# Patient Record
Sex: FEMALE | NOT HISPANIC OR LATINO | Employment: OTHER | URBAN - METROPOLITAN AREA
[De-identification: names, ages, dates, MRNs, and addresses within clinical notes are randomized per-mention and may not be internally consistent; named-entity substitution may affect disease eponyms.]

---

## 2023-08-24 ENCOUNTER — HOSPITAL ENCOUNTER (EMERGENCY)
Facility: CLINIC | Age: 68
Discharge: HOME | End: 2023-08-24
Attending: EMERGENCY MEDICINE | Admitting: EMERGENCY MEDICINE
Payer: COMMERCIAL

## 2023-08-24 ENCOUNTER — APPOINTMENT (OUTPATIENT)
Dept: CT IMAGING | Facility: CLINIC | Age: 68
End: 2023-08-24
Payer: COMMERCIAL

## 2023-08-24 VITALS
OXYGEN SATURATION: 99 % | SYSTOLIC BLOOD PRESSURE: 174 MMHG | TEMPERATURE: 98 F | BODY MASS INDEX: 31.98 KG/M2 | RESPIRATION RATE: 16 BRPM | HEIGHT: 68 IN | HEART RATE: 71 BPM | DIASTOLIC BLOOD PRESSURE: 84 MMHG | WEIGHT: 211 LBS

## 2023-08-24 DIAGNOSIS — K63.89 EPIPLOIC APPENDAGITIS: ICD-10-CM

## 2023-08-24 LAB
ALBUMIN SERPL-MCNC: 4 G/DL (ref 3.5–5)
ALBUMIN UR-MCNC: NEGATIVE MG/DL
ALP SERPL-CCNC: 76 U/L (ref 45–120)
ALT SERPL W P-5'-P-CCNC: 17 U/L (ref 0–45)
ANION GAP SERPL CALCULATED.3IONS-SCNC: 13 MMOL/L (ref 5–18)
APPEARANCE UR: CLEAR
AST SERPL W P-5'-P-CCNC: 14 U/L (ref 0–40)
BASOPHILS # BLD AUTO: 0.1 10E3/UL (ref 0–0.2)
BASOPHILS NFR BLD AUTO: 1 %
BILIRUB DIRECT SERPL-MCNC: 0.2 MG/DL
BILIRUB SERPL-MCNC: 0.8 MG/DL (ref 0–1)
BILIRUB UR QL STRIP: NEGATIVE
BUN SERPL-MCNC: 14 MG/DL (ref 8–22)
CALCIUM SERPL-MCNC: 9.8 MG/DL (ref 8.5–10.5)
CHLORIDE BLD-SCNC: 107 MMOL/L (ref 98–107)
CO2 SERPL-SCNC: 21 MMOL/L (ref 22–31)
COLOR UR AUTO: COLORLESS
CREAT SERPL-MCNC: 0.79 MG/DL (ref 0.6–1.1)
EOSINOPHIL # BLD AUTO: 0.1 10E3/UL (ref 0–0.7)
EOSINOPHIL NFR BLD AUTO: 1 %
ERYTHROCYTE [DISTWIDTH] IN BLOOD BY AUTOMATED COUNT: 13.2 % (ref 10–15)
GFR SERPL CREATININE-BSD FRML MDRD: 83 ML/MIN/1.73M2
GLUCOSE BLD-MCNC: 112 MG/DL (ref 70–125)
GLUCOSE UR STRIP-MCNC: NEGATIVE MG/DL
HCT VFR BLD AUTO: 41.3 % (ref 35–47)
HGB BLD-MCNC: 13.5 G/DL (ref 11.7–15.7)
HGB UR QL STRIP: NEGATIVE
IMM GRANULOCYTES # BLD: 0 10E3/UL
IMM GRANULOCYTES NFR BLD: 0 %
KETONES UR STRIP-MCNC: NEGATIVE MG/DL
LEUKOCYTE ESTERASE UR QL STRIP: NEGATIVE
LIPASE SERPL-CCNC: 34 U/L (ref 0–52)
LYMPHOCYTES # BLD AUTO: 2.1 10E3/UL (ref 0.8–5.3)
LYMPHOCYTES NFR BLD AUTO: 27 %
MCH RBC QN AUTO: 28 PG (ref 26.5–33)
MCHC RBC AUTO-ENTMCNC: 32.7 G/DL (ref 31.5–36.5)
MCV RBC AUTO: 86 FL (ref 78–100)
MONOCYTES # BLD AUTO: 0.6 10E3/UL (ref 0–1.3)
MONOCYTES NFR BLD AUTO: 7 %
NEUTROPHILS # BLD AUTO: 5.1 10E3/UL (ref 1.6–8.3)
NEUTROPHILS NFR BLD AUTO: 64 %
NITRATE UR QL: NEGATIVE
NRBC # BLD AUTO: 0 10E3/UL
NRBC BLD AUTO-RTO: 0 /100
PH UR STRIP: 6.5 [PH] (ref 5–7)
PLATELET # BLD AUTO: 257 10E3/UL (ref 150–450)
POTASSIUM BLD-SCNC: 4.2 MMOL/L (ref 3.5–5)
PROT SERPL-MCNC: 7.4 G/DL (ref 6–8)
RBC # BLD AUTO: 4.82 10E6/UL (ref 3.8–5.2)
RBC URINE: 1 /HPF
SODIUM SERPL-SCNC: 141 MMOL/L (ref 136–145)
SP GR UR STRIP: <1.005 (ref 1–1.03)
SQUAMOUS EPITHELIAL: 1 /HPF
UROBILINOGEN UR STRIP-MCNC: <2 MG/DL
WBC # BLD AUTO: 7.9 10E3/UL (ref 4–11)
WBC URINE: 1 /HPF

## 2023-08-24 PROCEDURE — 82248 BILIRUBIN DIRECT: CPT

## 2023-08-24 PROCEDURE — 81001 URINALYSIS AUTO W/SCOPE: CPT

## 2023-08-24 PROCEDURE — 83690 ASSAY OF LIPASE: CPT

## 2023-08-24 PROCEDURE — 74177 CT ABD & PELVIS W/CONTRAST: CPT

## 2023-08-24 PROCEDURE — 99285 EMERGENCY DEPT VISIT HI MDM: CPT | Mod: 25

## 2023-08-24 PROCEDURE — 250N000013 HC RX MED GY IP 250 OP 250 PS 637

## 2023-08-24 PROCEDURE — 80053 COMPREHEN METABOLIC PANEL: CPT

## 2023-08-24 PROCEDURE — 250N000011 HC RX IP 250 OP 636: Performed by: EMERGENCY MEDICINE

## 2023-08-24 PROCEDURE — 85025 COMPLETE CBC W/AUTO DIFF WBC: CPT

## 2023-08-24 PROCEDURE — 36415 COLL VENOUS BLD VENIPUNCTURE: CPT

## 2023-08-24 PROCEDURE — 82310 ASSAY OF CALCIUM: CPT

## 2023-08-24 RX ORDER — ONDANSETRON 4 MG/1
4 TABLET, ORALLY DISINTEGRATING ORAL EVERY 8 HOURS PRN
Qty: 10 TABLET | Refills: 0 | Status: SHIPPED | OUTPATIENT
Start: 2023-08-24 | End: 2023-08-27

## 2023-08-24 RX ORDER — ACETAMINOPHEN 325 MG/1
650 TABLET ORAL ONCE
Status: COMPLETED | OUTPATIENT
Start: 2023-08-24 | End: 2023-08-24

## 2023-08-24 RX ORDER — IOPAMIDOL 755 MG/ML
100 INJECTION, SOLUTION INTRAVASCULAR ONCE
Status: COMPLETED | OUTPATIENT
Start: 2023-08-24 | End: 2023-08-24

## 2023-08-24 RX ADMIN — IOPAMIDOL 100 ML: 755 INJECTION, SOLUTION INTRAVENOUS at 11:11

## 2023-08-24 RX ADMIN — ACETAMINOPHEN 650 MG: 325 TABLET ORAL at 10:36

## 2023-08-24 ASSESSMENT — ENCOUNTER SYMPTOMS
DYSURIA: 0
VOMITING: 0
NAUSEA: 0
BACK PAIN: 0
LIGHT-HEADEDNESS: 0
CONSTIPATION: 1
ABDOMINAL PAIN: 1
DIZZINESS: 0
FREQUENCY: 0

## 2023-08-24 ASSESSMENT — ACTIVITIES OF DAILY LIVING (ADL)
ADLS_ACUITY_SCORE: 35
ADLS_ACUITY_SCORE: 35

## 2023-08-24 NOTE — ED TRIAGE NOTES
Pt woke with RLQ abdominal pain this morning.  Denies nausea, vomiting or diarrhea.  Afebrile.     Triage Assessment       Row Name 08/24/23 0954       Triage Assessment (Adult)    Airway WDL WDL       Respiratory WDL    Respiratory WDL WDL       Skin Circulation/Temperature WDL    Skin Circulation/Temperature WDL WDL       Cardiac WDL    Cardiac WDL WDL       Peripheral/Neurovascular WDL    Peripheral Neurovascular WDL WDL       Cognitive/Neuro/Behavioral WDL    Cognitive/Neuro/Behavioral WDL WDL

## 2023-08-24 NOTE — DISCHARGE INSTRUCTIONS
Rest.  Drink lots of fluids.  Take Tylenol as needed for your pain.  Take Zofran as prescribed.  Follow-up with your primary care doctor to discuss your ED visit.  Return to the ED if new symptoms develop (fevers, chills) or if symptoms worsen.

## 2023-08-24 NOTE — ED PROVIDER NOTES
"Emergency Department Staff Physician Note     I had a face to face encounter with this patient seen by the Advanced Practice Provider (SHAWNA).  I have seen, examined, and discussed the patient with the SHAWNA and agree with their assessment and plan of management.    Relevant HPI:     Carri Hauser is a 67 year old female who presents to the Emergency Department for evaluation of abdominal pain    Patient Woke up this morning with abdominal pain, though she was able to fall back asleep. She woke again and has consistently been experiencing the pain in her right lower quadrant that is exasperated by bumps in the road when driving. She is also constipated. She denies nausea, vomiting, urinary symptoms, frequency, chest pain, dizziness, lightheadedness, or back pain, and she is otherwise at a normal baseline of health.    I, Joshua Haro, am serving as a scribe to document services personally performed by Wil Bentley D.O., based on my observations and the provider's statements to me.   I, Wil Bentley D.O., attest that Joshua Haro was acting in a scribe capacity, has observed my performance of the services and has documented them in accordance with my direction.    ED Course:  10:11 AM  I received the patient report from the SHAWNA, Genesis Person. I agree with their assessment and plan of management, and I will see the patient.  10:14 AM I met with the patient to introduce myself, gather additional history, perform my initial exam, and discuss the plan.     Brief Physical Exam:  VITAL SIGNS: BP (!) 174/84   Pulse 71   Temp 98  F (36.7  C) (Temporal)   Resp 16   Ht 1.727 m (5' 8\")   Wt 95.7 kg (211 lb)   SpO2 99%   BMI 32.08 kg/m      General Appearance: Well-appearing, well-nourished, no acute distress   Head:  Normocephalic, without obvious abnormality, atraumatic  Eyes:  PERRL, conjunctiva/corneas clear, EOM's intact,  ENT:  Lips, mucosa, and tongue normal, membranes are moist without pallor  Neck:  Normal " ROM, symmetrical, trachea midline    Cardio:  Regular rate and rhythm, no murmur, rub or gallop, 2+ pulses symmetric in all extremities  Pulm:  Clear to auscultation bilaterally, respirations unlabored,   Abdomen:  Soft, no guarding. Positive Rovsing's sign. Right lower quadrant tenderness with rebound.  Musculoskeletal: Full ROM, no edema, no cyanosis, good ROM of major joints  Integument:  Warm, Dry, No erythema, No rash.    Neurologic:  Alert & oriented.  No focal deficits appreciated.  Ambulatory.  Psychiatric:  Affect normal, Judgment normal, Mood normal.         Impression / ED Plan:  I had a face to face encounter with this patient seen by the Advanced Practice Provider (SHAWNA).  I have seen, examined, and discussed the patient with the SHAWNA and agree with their assessment and plan of management. I personally saw the patient and performed a substantive portion of the visit including all aspects of the medical decision making.    Carri Hauser is a 67 year old female presents to the ED for evaluation of right-sided abdominal pain.  Patient's pain is primarily mid to right lower abdomen.  Did have some rebound tenderness.  Initial differential included appendicitis, cholelithiasis, cholecystitis, diverticulitis, bowel obstruction, volvulus, mesenteric ischemia.  CT imaging was performed, no evidence of appendicitis, however was consistent with epiploic appendagitis which would be consistent with the patient's pain.  At this time I do believe the patient is appropriate for discharge with outpatient follow-up with primary care provider.  Return precautions discussed..     1. Epiploic appendagitis        Wil Bentley D.O.  Staff Physician  [facility] Emergency Department      Wil Bentley,   08/24/23 5606

## 2023-08-24 NOTE — ED PROVIDER NOTES
EMERGENCY DEPARTMENT ENCOUNTER      NAME: Carri Hauser  AGE: 67 year old female  YOB: 1955  MRN: [id number]  EVALUATION DATE & TIME: 8/24/2023 10:00 AM    PCP: No primary care provider on file.    ED PROVIDER: Genesis Person PA-C      Chief Complaint   Patient presents with    Abdominal Pain     FINAL IMPRESSION:  1. Epiploic appendagitis      ED COURSE & MEDICAL DECISION MAKING:    Pertinent Labs & Imaging studies reviewed. (See chart for details)  67 year old female presents to the Emergency Department for evaluation of lower quadrant abdominal pain.  This morning patient woke up with right lower quadrant abdominal pain that has been constant since it started.  Nonradiating.  Vital signs reviewed and patient is hypertensive most likely secondary due to pain.  Afebrile.  On exam patient has right lower quadrant abdominal pain.  No CVA tenderness bilaterally.  No rash.    Differential diagnosis includes appendicitis, kidney stone, UTI, small bowel obstruction, diverticulitis.  Patient has a history of cholecystectomy. CBC unremarkable.  No white blood cell elevation.  Basic is unremarkable.  Lipase and hepatic unremarkable.  UA is unremarkable.  CT scan shows a small segment into the colon at the hepatic flexure is mildly thick-walled with adjacent ovoid omental fat inflammation most suggestive of epiploic appendagitis with reactive colitis.  Also low attending UA showed 9 mm peritoneal nodule in the upper abdomen is of unclear clinical significance.  Was educated on her imaging and lab results. Received a dose of Tylenol and Dilaudid while in the ED.  Patient resting comfortably.    Patient will be discharged home.  Patient will follow-up with her primary care doctor to discuss her ED visit.  Patient return to the ED if new symptoms develop or symptoms worsen.  Questions answered.  Patient agrees with plan.      ED COURSE:   10:03 AM I saw the patient.   10:11 AM Staffed patient with Dr. De La Torre  Mc and discussed plan of care.  12:44 PM discharged home.  Patient with plan.  All questions answered.       At the conclusion of the encounter I discussed the results of all of the tests and the disposition. The questions were answered. The patient or family acknowledged understanding and was agreeable with the care plan.     0 minutes of critical care time       Additional Documentation    History:  Supplemental history from: Documented in chart, if applicable  External Record(s) reviewed: Documented in chart, if applicable.    Work Up:  Chart documentation includes differential considered and any EKGs or imaging interpreted by provider.  In additional to work up documented, I considered the following work up: Documented in chart, if applicable.    External consultation:  Discussion of management with another provider: Documented in chart, if applicable    Complicating factors:  Care impacted by chronic illness: Heart Disease, Hyperlipidemia, and Hypertension  Care affected by social determinants of health: N/A    Disposition considerations: Discharge. No recommendations on prescription strength medication(s). See documentation for any additional details.      MEDICATIONS GIVEN IN THE EMERGENCY:  Medications   acetaminophen (TYLENOL) tablet 650 mg (650 mg Oral $Given 8/24/23 1036)   iopamidol (ISOVUE-370) solution 100 mL (100 mLs Intravenous $Given 8/24/23 1111)   HYDROmorphone (DILAUDID) injection 1 mg (1 mg Intravenous Not Given 8/24/23 1302)       NEW PRESCRIPTIONS STARTED AT TODAY'S ER VISIT  Discharge Medication List as of 8/24/2023 12:44 PM        START taking these medications    Details   ondansetron (ZOFRAN ODT) 4 MG ODT tab Take 1 tablet (4 mg) by mouth every 8 hours as needed for nausea, Disp-10 tablet, R-0, Local Print                =================================================================    HPI    Patient information was obtained from: patient     Use of : N/ADITYA AGUILAR  "Murtaza is a 67 year old female with a pertinent history of hypertension, hyperlipidemia, atrial fibrillation, arteriosclerotic cardiovascular disease, hypothyroidism, who presents to this ED via walk-in for evaluation of abdominal pain.    Patient woke up this morning with abdominal pain, though she was able to fall back asleep. She woke again and has consistently been experiencing the pain in her right lower quadrant that is exasperated by bumps in the road when driving. She is also constipated. She denies nausea, vomiting, urinary symptoms, frequency, chest pain, dizziness, lightheadedness, or back pain, and she is otherwise at a normal baseline of health.    She has a surgical history of cholecystectomy. She drinks casually and does not smoke.    REVIEW OF SYSTEMS   Review of Systems   Cardiovascular:  Negative for chest pain.   Gastrointestinal:  Positive for abdominal pain (right lower quadrant) and constipation. Negative for nausea and vomiting.   Genitourinary:  Negative for dysuria and frequency.   Musculoskeletal:  Negative for back pain.   Neurological:  Negative for dizziness and light-headedness.        PAST MEDICAL HISTORY:  No past medical history on file.    PAST SURGICAL HISTORY:  No past surgical history on file.        CURRENT MEDICATIONS:    ondansetron (ZOFRAN ODT) 4 MG ODT tab        ALLERGIES:  Allergies   Allergen Reactions    Pseudoephedrine Other (See Comments)     Other Reaction(s): Tachycardia    Tetracycline Hives    Adhesive Tape Rash     Echocardiogram stickers       FAMILY HISTORY:  No family history on file.    SOCIAL HISTORY:   Social History     Socioeconomic History    Marital status:        VITALS:  BP (!) 174/84   Pulse 71   Temp 98  F (36.7  C) (Temporal)   Resp 16   Ht 1.727 m (5' 8\")   Wt 95.7 kg (211 lb)   SpO2 99%   BMI 32.08 kg/m      PHYSICAL EXAM    Physical Exam  Vitals and nursing note reviewed.   Constitutional:       Appearance: Normal appearance.   HENT: "      Head: Atraumatic.      Right Ear: External ear normal.      Left Ear: External ear normal.      Nose: Nose normal.      Mouth/Throat:      Mouth: Mucous membranes are moist.   Eyes:      Conjunctiva/sclera: Conjunctivae normal.      Pupils: Pupils are equal, round, and reactive to light.   Cardiovascular:      Rate and Rhythm: Normal rate and regular rhythm.      Pulses: Normal pulses.      Heart sounds: Normal heart sounds. No murmur heard.     No friction rub. No gallop.   Pulmonary:      Effort: Pulmonary effort is normal.      Breath sounds: Normal breath sounds. No wheezing or rales.   Abdominal:      General: Bowel sounds are normal.      Tenderness: There is abdominal tenderness in the right lower quadrant. There is no right CVA tenderness, left CVA tenderness, guarding or rebound. Positive signs include Rovsing's sign.   Musculoskeletal:      Cervical back: Normal range of motion.   Skin:     General: Skin is dry.      Capillary Refill: Capillary refill takes less than 2 seconds.      Findings: No rash.   Neurological:      Mental Status: She is alert. Mental status is at baseline.   Psychiatric:         Mood and Affect: Mood normal.         Thought Content: Thought content normal.          LAB:  All pertinent labs reviewed and interpreted.  Labs Ordered and Resulted from Time of ED Arrival to Time of ED Departure   BASIC METABOLIC PANEL - Abnormal       Result Value    Sodium 141      Potassium 4.2      Chloride 107      Carbon Dioxide (CO2) 21 (*)     Anion Gap 13      Urea Nitrogen 14      Creatinine 0.79      Calcium 9.8      Glucose 112      GFR Estimate 83     ROUTINE UA WITH MICROSCOPIC REFLEX TO CULTURE - Normal    Color Urine Colorless      Appearance Urine Clear      Glucose Urine Negative      Bilirubin Urine Negative      Ketones Urine Negative      Specific Gravity Urine <1.005      Blood Urine Negative      pH Urine 6.5      Protein Albumin Urine Negative      Urobilinogen Urine <2.0       Nitrite Urine Negative      Leukocyte Esterase Urine Negative      RBC Urine 1      WBC Urine 1      Squamous Epithelials Urine 1     LIPASE - Normal    Lipase 34     HEPATIC FUNCTION PANEL - Normal    Bilirubin Total 0.8      Bilirubin Direct 0.2      Protein Total 7.4      Albumin 4.0      Alkaline Phosphatase 76      AST 14      ALT 17     CBC WITH PLATELETS AND DIFFERENTIAL    WBC Count 7.9      RBC Count 4.82      Hemoglobin 13.5      Hematocrit 41.3      MCV 86      MCH 28.0      MCHC 32.7      RDW 13.2      Platelet Count 257      % Neutrophils 64      % Lymphocytes 27      % Monocytes 7      % Eosinophils 1      % Basophils 1      % Immature Granulocytes 0      NRBCs per 100 WBC 0      Absolute Neutrophils 5.1      Absolute Lymphocytes 2.1      Absolute Monocytes 0.6      Absolute Eosinophils 0.1      Absolute Basophils 0.1      Absolute Immature Granulocytes 0.0      Absolute NRBCs 0.0          RADIOLOGY:  Reviewed all pertinent imaging. Please see official radiology report.  CT Abdomen Pelvis w Contrast   Final Result   IMPRESSION:    1.  A small segment of the colon at the hepatic flexure is mildly thick-walled with adjacent ovoid omental fat inflammation, most suggestive of epiploic appendagitis with a reactive colitis. Consider screening colonoscopy after acute phase if clinically    appropriate.   2.  A low attenuating 9 mm peritoneal nodule in the upper abdomen is of unclear clinical significance and can be reassessed with CT abdomen pelvis in 3-6 months.        I, Joshua Haro, am serving as a scribe to document services personally performed by Genesis Person PA-C, based on my observation and the provider's statements to me. I, Genesis Person PA-C, attest that Joshua Haro is acting in a scribe capacity, has observed my performance of the services and has documented them in accordance with my direction.    Genesis Person PA-C  [facility] EMERGENCY  ROOM  [address] [place] [id number] [phone]     Genesis Person PA-C  08/24/23 1429

## 2024-09-16 ENCOUNTER — APPOINTMENT (OUTPATIENT)
Dept: CT IMAGING | Facility: CLINIC | Age: 69
DRG: 314 | End: 2024-09-16
Attending: STUDENT IN AN ORGANIZED HEALTH CARE EDUCATION/TRAINING PROGRAM
Payer: COMMERCIAL

## 2024-09-16 ENCOUNTER — HOSPITAL ENCOUNTER (INPATIENT)
Facility: CLINIC | Age: 69
LOS: 1 days | Discharge: HOME | DRG: 314 | End: 2024-09-17
Attending: STUDENT IN AN ORGANIZED HEALTH CARE EDUCATION/TRAINING PROGRAM | Admitting: INTERNAL MEDICINE
Payer: COMMERCIAL

## 2024-09-16 ENCOUNTER — APPOINTMENT (OUTPATIENT)
Dept: CARDIOLOGY | Facility: CLINIC | Age: 69
DRG: 314 | End: 2024-09-16
Attending: INTERNAL MEDICINE
Payer: COMMERCIAL

## 2024-09-16 DIAGNOSIS — I30.0 ACUTE IDIOPATHIC PERICARDITIS: Primary | ICD-10-CM

## 2024-09-16 DIAGNOSIS — I10 PRIMARY HYPERTENSION: ICD-10-CM

## 2024-09-16 DIAGNOSIS — R07.9 CHEST PAIN, UNSPECIFIED TYPE: ICD-10-CM

## 2024-09-16 PROBLEM — I71.20 THORACIC AORTIC ANEURYSM WITHOUT RUPTURE (H): Status: ACTIVE | Noted: 2021-08-02

## 2024-09-16 PROBLEM — E66.812 OBESITY, CLASS II, BMI 35-39.9: Status: ACTIVE | Noted: 2024-09-16

## 2024-09-16 PROBLEM — I48.0 AF (PAROXYSMAL ATRIAL FIBRILLATION) (H): Status: ACTIVE | Noted: 2023-07-27

## 2024-09-16 PROBLEM — K76.0 HEPATIC STEATOSIS: Status: ACTIVE | Noted: 2021-07-14

## 2024-09-16 PROBLEM — E78.5 HYPERLIPIDEMIA: Status: ACTIVE | Noted: 2024-09-16

## 2024-09-16 PROBLEM — E03.9 HYPOTHYROIDISM (ACQUIRED): Status: ACTIVE | Noted: 2021-07-14

## 2024-09-16 PROBLEM — E04.2 MULTIPLE THYROID NODULES: Status: ACTIVE | Noted: 2019-01-07

## 2024-09-16 PROBLEM — I25.10 ASCVD (ARTERIOSCLEROTIC CARDIOVASCULAR DISEASE): Status: ACTIVE | Noted: 2018-04-19

## 2024-09-16 PROBLEM — R60.9 DEPENDENT EDEMA: Status: ACTIVE | Noted: 2017-07-20

## 2024-09-16 PROBLEM — S42.255D: Status: ACTIVE | Noted: 2023-02-06

## 2024-09-16 LAB
ALBUMIN SERPL BCG-MCNC: 4.2 G/DL (ref 3.5–5.2)
ALP SERPL-CCNC: 72 U/L (ref 40–150)
ALT SERPL W P-5'-P-CCNC: 14 U/L (ref 0–50)
ANION GAP SERPL CALCULATED.3IONS-SCNC: 14 MMOL/L (ref 7–15)
AST SERPL W P-5'-P-CCNC: 16 U/L (ref 0–45)
ATRIAL RATE - MUSE: 73 BPM
ATRIAL RATE - MUSE: 74 BPM
BASOPHILS # BLD AUTO: 0 10E3/UL (ref 0–0.2)
BASOPHILS NFR BLD AUTO: 0 %
BILIRUB SERPL-MCNC: 0.4 MG/DL
BUN SERPL-MCNC: 14.1 MG/DL (ref 8–23)
CALCIUM SERPL-MCNC: 9.3 MG/DL (ref 8.8–10.4)
CHLORIDE SERPL-SCNC: 106 MMOL/L (ref 98–107)
CREAT BLD-MCNC: 0.8 MG/DL (ref 0.6–1.1)
CREAT SERPL-MCNC: 0.79 MG/DL (ref 0.51–0.95)
CRP SERPL-MCNC: 6.04 MG/L
DIASTOLIC BLOOD PRESSURE - MUSE: 107 MMHG
DIASTOLIC BLOOD PRESSURE - MUSE: NORMAL MMHG
EGFRCR SERPLBLD CKD-EPI 2021: 82 ML/MIN/1.73M2
EGFRCR SERPLBLD CKD-EPI 2021: >60 ML/MIN/1.73M2
EOSINOPHIL # BLD AUTO: 0.1 10E3/UL (ref 0–0.7)
EOSINOPHIL NFR BLD AUTO: 1 %
ERYTHROCYTE [DISTWIDTH] IN BLOOD BY AUTOMATED COUNT: 13.2 % (ref 10–15)
GLUCOSE SERPL-MCNC: 127 MG/DL (ref 70–99)
HCO3 SERPL-SCNC: 25 MMOL/L (ref 22–29)
HCT VFR BLD AUTO: 37.7 % (ref 35–47)
HGB BLD-MCNC: 12.5 G/DL (ref 11.7–15.7)
HOLD SPECIMEN: NORMAL
IMM GRANULOCYTES # BLD: 0 10E3/UL
IMM GRANULOCYTES NFR BLD: 0 %
INTERPRETATION ECG - MUSE: NORMAL
INTERPRETATION ECG - MUSE: NORMAL
LVEF ECHO: NORMAL
LYMPHOCYTES # BLD AUTO: 2.4 10E3/UL (ref 0.8–5.3)
LYMPHOCYTES NFR BLD AUTO: 23 %
MAGNESIUM SERPL-MCNC: 1.9 MG/DL (ref 1.7–2.3)
MCH RBC QN AUTO: 28.2 PG (ref 26.5–33)
MCHC RBC AUTO-ENTMCNC: 33.2 G/DL (ref 31.5–36.5)
MCV RBC AUTO: 85 FL (ref 78–100)
MONOCYTES # BLD AUTO: 0.6 10E3/UL (ref 0–1.3)
MONOCYTES NFR BLD AUTO: 5 %
NEUTROPHILS # BLD AUTO: 7.4 10E3/UL (ref 1.6–8.3)
NEUTROPHILS NFR BLD AUTO: 70 %
NRBC # BLD AUTO: 0 10E3/UL
NRBC BLD AUTO-RTO: 0 /100
NT-PROBNP SERPL-MCNC: 152 PG/ML (ref 0–900)
P AXIS - MUSE: 53 DEGREES
P AXIS - MUSE: 62 DEGREES
PLATELET # BLD AUTO: 215 10E3/UL (ref 150–450)
POTASSIUM SERPL-SCNC: 3.8 MMOL/L (ref 3.4–5.3)
PR INTERVAL - MUSE: 166 MS
PR INTERVAL - MUSE: 168 MS
PROT SERPL-MCNC: 7.2 G/DL (ref 6.4–8.3)
QRS DURATION - MUSE: 76 MS
QRS DURATION - MUSE: 76 MS
QT - MUSE: 410 MS
QT - MUSE: 422 MS
QTC - MUSE: 455 MS
QTC - MUSE: 464 MS
R AXIS - MUSE: 11 DEGREES
R AXIS - MUSE: 14 DEGREES
RBC # BLD AUTO: 4.44 10E6/UL (ref 3.8–5.2)
SODIUM SERPL-SCNC: 145 MMOL/L (ref 135–145)
SYSTOLIC BLOOD PRESSURE - MUSE: 220 MMHG
SYSTOLIC BLOOD PRESSURE - MUSE: NORMAL MMHG
T AXIS - MUSE: 59 DEGREES
T AXIS - MUSE: 69 DEGREES
TROPONIN T SERPL HS-MCNC: 6 NG/L
TROPONIN T SERPL HS-MCNC: 7 NG/L
TROPONIN T SERPL HS-MCNC: <6 NG/L
VENTRICULAR RATE- MUSE: 73 BPM
VENTRICULAR RATE- MUSE: 74 BPM
WBC # BLD AUTO: 10.6 10E3/UL (ref 4–11)

## 2024-09-16 PROCEDURE — G0378 HOSPITAL OBSERVATION PER HR: HCPCS

## 2024-09-16 PROCEDURE — 80053 COMPREHEN METABOLIC PANEL: CPT | Performed by: STUDENT IN AN ORGANIZED HEALTH CARE EDUCATION/TRAINING PROGRAM

## 2024-09-16 PROCEDURE — 83880 ASSAY OF NATRIURETIC PEPTIDE: CPT | Performed by: STUDENT IN AN ORGANIZED HEALTH CARE EDUCATION/TRAINING PROGRAM

## 2024-09-16 PROCEDURE — 82565 ASSAY OF CREATININE: CPT

## 2024-09-16 PROCEDURE — 250N000013 HC RX MED GY IP 250 OP 250 PS 637: Performed by: STUDENT IN AN ORGANIZED HEALTH CARE EDUCATION/TRAINING PROGRAM

## 2024-09-16 PROCEDURE — 93308 TTE F-UP OR LMTD: CPT | Mod: 26 | Performed by: INTERNAL MEDICINE

## 2024-09-16 PROCEDURE — 96361 HYDRATE IV INFUSION ADD-ON: CPT

## 2024-09-16 PROCEDURE — 96376 TX/PRO/DX INJ SAME DRUG ADON: CPT

## 2024-09-16 PROCEDURE — 120N000004 HC R&B MS OVERFLOW

## 2024-09-16 PROCEDURE — 86140 C-REACTIVE PROTEIN: CPT | Performed by: INTERNAL MEDICINE

## 2024-09-16 PROCEDURE — 250N000013 HC RX MED GY IP 250 OP 250 PS 637: Performed by: INTERNAL MEDICINE

## 2024-09-16 PROCEDURE — 36415 COLL VENOUS BLD VENIPUNCTURE: CPT | Performed by: INTERNAL MEDICINE

## 2024-09-16 PROCEDURE — 99204 OFFICE O/P NEW MOD 45 MIN: CPT | Performed by: INTERNAL MEDICINE

## 2024-09-16 PROCEDURE — 99207 PR APP CREDIT; MD BILLING SHARED VISIT: CPT | Performed by: INTERNAL MEDICINE

## 2024-09-16 PROCEDURE — 93005 ELECTROCARDIOGRAM TRACING: CPT | Performed by: INTERNAL MEDICINE

## 2024-09-16 PROCEDURE — 93325 DOPPLER ECHO COLOR FLOW MAPG: CPT

## 2024-09-16 PROCEDURE — 96375 TX/PRO/DX INJ NEW DRUG ADDON: CPT

## 2024-09-16 PROCEDURE — 99223 1ST HOSP IP/OBS HIGH 75: CPT | Performed by: INTERNAL MEDICINE

## 2024-09-16 PROCEDURE — 99285 EMERGENCY DEPT VISIT HI MDM: CPT | Mod: 25

## 2024-09-16 PROCEDURE — 85004 AUTOMATED DIFF WBC COUNT: CPT | Performed by: STUDENT IN AN ORGANIZED HEALTH CARE EDUCATION/TRAINING PROGRAM

## 2024-09-16 PROCEDURE — 84484 ASSAY OF TROPONIN QUANT: CPT | Performed by: STUDENT IN AN ORGANIZED HEALTH CARE EDUCATION/TRAINING PROGRAM

## 2024-09-16 PROCEDURE — 93325 DOPPLER ECHO COLOR FLOW MAPG: CPT | Mod: 26 | Performed by: INTERNAL MEDICINE

## 2024-09-16 PROCEDURE — 96374 THER/PROPH/DIAG INJ IV PUSH: CPT | Mod: 59

## 2024-09-16 PROCEDURE — 250N000011 HC RX IP 250 OP 636: Performed by: STUDENT IN AN ORGANIZED HEALTH CARE EDUCATION/TRAINING PROGRAM

## 2024-09-16 PROCEDURE — 99418 PROLNG IP/OBS E/M EA 15 MIN: CPT | Performed by: INTERNAL MEDICINE

## 2024-09-16 PROCEDURE — 93321 DOPPLER ECHO F-UP/LMTD STD: CPT | Mod: 26 | Performed by: INTERNAL MEDICINE

## 2024-09-16 PROCEDURE — 84484 ASSAY OF TROPONIN QUANT: CPT | Performed by: INTERNAL MEDICINE

## 2024-09-16 PROCEDURE — 83735 ASSAY OF MAGNESIUM: CPT | Performed by: STUDENT IN AN ORGANIZED HEALTH CARE EDUCATION/TRAINING PROGRAM

## 2024-09-16 PROCEDURE — 93005 ELECTROCARDIOGRAM TRACING: CPT | Performed by: STUDENT IN AN ORGANIZED HEALTH CARE EDUCATION/TRAINING PROGRAM

## 2024-09-16 PROCEDURE — 71275 CT ANGIOGRAPHY CHEST: CPT

## 2024-09-16 PROCEDURE — 258N000003 HC RX IP 258 OP 636: Performed by: INTERNAL MEDICINE

## 2024-09-16 PROCEDURE — 36415 COLL VENOUS BLD VENIPUNCTURE: CPT | Performed by: STUDENT IN AN ORGANIZED HEALTH CARE EDUCATION/TRAINING PROGRAM

## 2024-09-16 RX ORDER — NITROGLYCERIN 0.4 MG/1
0.4 TABLET SUBLINGUAL ONCE
Status: DISCONTINUED | OUTPATIENT
Start: 2024-09-16 | End: 2024-09-16

## 2024-09-16 RX ORDER — IBUPROFEN 600 MG/1
600 TABLET, FILM COATED ORAL
Status: DISCONTINUED | OUTPATIENT
Start: 2024-09-16 | End: 2024-09-17

## 2024-09-16 RX ORDER — NITROGLYCERIN 0.4 MG/1
0.4 TABLET SUBLINGUAL EVERY 5 MIN PRN
Status: DISCONTINUED | OUTPATIENT
Start: 2024-09-16 | End: 2024-09-17 | Stop reason: HOSPADM

## 2024-09-16 RX ORDER — SODIUM CHLORIDE, SODIUM LACTATE, POTASSIUM CHLORIDE, CALCIUM CHLORIDE 600; 310; 30; 20 MG/100ML; MG/100ML; MG/100ML; MG/100ML
INJECTION, SOLUTION INTRAVENOUS CONTINUOUS
Status: DISCONTINUED | OUTPATIENT
Start: 2024-09-16 | End: 2024-09-16

## 2024-09-16 RX ORDER — COLCHICINE 0.6 MG/1
0.6 TABLET ORAL 2 TIMES DAILY
Status: DISCONTINUED | OUTPATIENT
Start: 2024-09-16 | End: 2024-09-17 | Stop reason: HOSPADM

## 2024-09-16 RX ORDER — ASPIRIN 81 MG/1
162 TABLET, CHEWABLE ORAL ONCE
Status: COMPLETED | OUTPATIENT
Start: 2024-09-16 | End: 2024-09-16

## 2024-09-16 RX ORDER — LISINOPRIL 10 MG/1
10 TABLET ORAL EVERY EVENING
Status: DISCONTINUED | OUTPATIENT
Start: 2024-09-16 | End: 2024-09-17 | Stop reason: HOSPADM

## 2024-09-16 RX ORDER — METOCLOPRAMIDE HYDROCHLORIDE 5 MG/ML
10 INJECTION INTRAMUSCULAR; INTRAVENOUS ONCE
Status: DISCONTINUED | OUTPATIENT
Start: 2024-09-16 | End: 2024-09-16

## 2024-09-16 RX ORDER — TRIAMCINOLONE ACETONIDE 1 MG/G
CREAM TOPICAL 3 TIMES DAILY PRN
COMMUNITY

## 2024-09-16 RX ORDER — ONDANSETRON 2 MG/ML
4 INJECTION INTRAMUSCULAR; INTRAVENOUS ONCE
Status: DISCONTINUED | OUTPATIENT
Start: 2024-09-16 | End: 2024-09-16

## 2024-09-16 RX ORDER — MAGNESIUM HYDROXIDE/ALUMINUM HYDROXICE/SIMETHICONE 120; 1200; 1200 MG/30ML; MG/30ML; MG/30ML
30 SUSPENSION ORAL EVERY 4 HOURS PRN
Status: DISCONTINUED | OUTPATIENT
Start: 2024-09-16 | End: 2024-09-17 | Stop reason: HOSPADM

## 2024-09-16 RX ORDER — LISINOPRIL 10 MG/1
10 TABLET ORAL DAILY
COMMUNITY

## 2024-09-16 RX ORDER — IOPAMIDOL 755 MG/ML
100 INJECTION, SOLUTION INTRAVASCULAR ONCE
Status: DISCONTINUED | OUTPATIENT
Start: 2024-09-16 | End: 2024-09-16

## 2024-09-16 RX ORDER — FENTANYL CITRATE 50 UG/ML
100 INJECTION, SOLUTION INTRAMUSCULAR; INTRAVENOUS ONCE
Status: DISCONTINUED | OUTPATIENT
Start: 2024-09-16 | End: 2024-09-16

## 2024-09-16 RX ORDER — ASPIRIN 81 MG/1
81 TABLET ORAL DAILY
Status: DISCONTINUED | OUTPATIENT
Start: 2024-09-17 | End: 2024-09-17

## 2024-09-16 RX ORDER — ROSUVASTATIN CALCIUM 10 MG/1
10 TABLET, COATED ORAL DAILY
COMMUNITY

## 2024-09-16 RX ORDER — ROSUVASTATIN CALCIUM 10 MG/1
10 TABLET, COATED ORAL EVERY EVENING
Status: DISCONTINUED | OUTPATIENT
Start: 2024-09-16 | End: 2024-09-17 | Stop reason: HOSPADM

## 2024-09-16 RX ADMIN — ONDANSETRON 4 MG: 2 INJECTION INTRAMUSCULAR; INTRAVENOUS at 04:14

## 2024-09-16 RX ADMIN — NITROGLYCERIN 0.4 MG: 0.4 TABLET SUBLINGUAL at 05:35

## 2024-09-16 RX ADMIN — ONDANSETRON 4 MG: 2 INJECTION INTRAMUSCULAR; INTRAVENOUS at 05:30

## 2024-09-16 RX ADMIN — COLCHICINE 0.6 MG: 0.6 TABLET ORAL at 19:57

## 2024-09-16 RX ADMIN — IBUPROFEN 600 MG: 600 TABLET ORAL at 11:59

## 2024-09-16 RX ADMIN — IOPAMIDOL 100 ML: 755 INJECTION, SOLUTION INTRAVENOUS at 04:31

## 2024-09-16 RX ADMIN — FENTANYL CITRATE 100 MCG: 50 INJECTION INTRAMUSCULAR; INTRAVENOUS at 05:01

## 2024-09-16 RX ADMIN — ASPIRIN 81 MG CHEWABLE TABLET 162 MG: 81 TABLET CHEWABLE at 06:46

## 2024-09-16 RX ADMIN — ROSUVASTATIN CALCIUM 10 MG: 10 TABLET, FILM COATED ORAL at 19:57

## 2024-09-16 RX ADMIN — SODIUM CHLORIDE, POTASSIUM CHLORIDE, SODIUM LACTATE AND CALCIUM CHLORIDE: 600; 310; 30; 20 INJECTION, SOLUTION INTRAVENOUS at 06:46

## 2024-09-16 RX ADMIN — IBUPROFEN 600 MG: 600 TABLET ORAL at 18:02

## 2024-09-16 RX ADMIN — LISINOPRIL 10 MG: 10 TABLET ORAL at 19:57

## 2024-09-16 RX ADMIN — NITROGLYCERIN 0.4 MG: 0.4 TABLET SUBLINGUAL at 04:05

## 2024-09-16 RX ADMIN — METOCLOPRAMIDE HYDROCHLORIDE 10 MG: 5 INJECTION INTRAMUSCULAR; INTRAVENOUS at 06:02

## 2024-09-16 RX ADMIN — FENTANYL CITRATE 100 MCG: 50 INJECTION INTRAMUSCULAR; INTRAVENOUS at 04:15

## 2024-09-16 ASSESSMENT — ACTIVITIES OF DAILY LIVING (ADL)
ADLS_ACUITY_SCORE: 37
ADLS_ACUITY_SCORE: 37
ADLS_ACUITY_SCORE: 22
ADLS_ACUITY_SCORE: 35
ADLS_ACUITY_SCORE: 37
ADLS_ACUITY_SCORE: 22
ADLS_ACUITY_SCORE: 22
ADLS_ACUITY_SCORE: 35
ADLS_ACUITY_SCORE: 37
ADLS_ACUITY_SCORE: 22
ADLS_ACUITY_SCORE: 22
ADLS_ACUITY_SCORE: 35
ADLS_ACUITY_SCORE: 37
ADLS_ACUITY_SCORE: 22

## 2024-09-16 ASSESSMENT — COLUMBIA-SUICIDE SEVERITY RATING SCALE - C-SSRS
6. HAVE YOU EVER DONE ANYTHING, STARTED TO DO ANYTHING, OR PREPARED TO DO ANYTHING TO END YOUR LIFE?: NO
1. IN THE PAST MONTH, HAVE YOU WISHED YOU WERE DEAD OR WISHED YOU COULD GO TO SLEEP AND NOT WAKE UP?: NO
2. HAVE YOU ACTUALLY HAD ANY THOUGHTS OF KILLING YOURSELF IN THE PAST MONTH?: NO

## 2024-09-16 NOTE — ED PROVIDER NOTES
Emergency Department Encounter         FINAL IMPRESSION:  Chest pain        ED COURSE AND MEDICAL DECISION MAKING       ED Course as of 09/20/24 0609   Mon Sep 16, 2024   0341 68-year-old female history of hypertension, hyperlipidemia, CAD, thoracic arctic aneurysm per chart review, here with sudden onset left-sided chest discomfort with radiation to the back as well as left arm discomfort that woke her from sleep this morning.  On arrival she states that the pain is worsened with deep inspiration.  No fevers chills nausea vomiting.  No abdominal pain.  Has been feeling well this week otherwise.  States she walks at least 4 times weekly approximate 3 miles and has had no significant decrease in her exercise tolerance.  No fevers chills cough congestion.  On arrival she looks uncomfortable, mild mild conversational dyspnea as well as some visible shortness of breath.  Lungs are clear bilaterally.  Normal heart sounds.  Normal pulses in the extremities.  No paresthesias.  No abdominal pain.  No leg swelling.  Will obtain CTA chest abdomen pelvis, basic labs and reevaluate.   0346 EKG is sinus 73, no signs acute ischemia, no inversions no depressions no STEMI, QTc is 464, no old EKGs for comparison.    0411 Delay in patient CTA she was a difficult IV access.  X 2 attempts by first nurse with ultrasound, we will attempt further sticks with another nurse with ultrasound.  In the meantime, patient received nitroglycerin which did not help her chest pain, we will try some fentanyl to see if this helps her discomfort.   0425 Fentanyl seem to help with the discomfort.  Repeat EKG showing no STEMI or evolving changes.  I decided to do an i-STAT as patient's pain is worsening here.  We will send her emergently over to CTA to rule out dissection/PE   0458 Personally reviewed patient's CTA with no obvious dissection aneurysm or central PE.  No pneumothorax.  No focal findings in patient's lung fields.   0458 Repeat EKG as  patient still states she is uncomfortable showing sinus rhythm 72, no signs acute ischemia, no inversions no depressions no STEMI, no changes from previous EKG   0613 Discussed case with hospitalist who agrees with admission.  Will start with observation telemetry admission.  Second troponin pending.       Additional ED Course Timeline:  3:33 AM I met with the patient, obtained history, performed an initial exam, and discussed options and plan for diagnostics and treatment here in the ED.                        Medical Decision Making  Obtained supplemental history:Supplemental history obtained?: No  Reviewed external records: External records reviewed?: Documented in chart  Care impacted by chronic illness:Hypertension and Other: GERD  Care significantly affected by social determinants of health:Access to Medical Care  Did you consider but not order tests?: Work up considered but not performed and documented in chart, if applicable  Did you interpret images independently?: Independent interpretation of ECG and images noted in documentation, when applicable.  Consultation discussion with other provider:Did you involve another provider (consultant, , pharmacy, etc.)?: I discussed the care with another health care provider, see documentation for details.  Admit.  CT Pulmonary Angiogram:Patient is moderate to high risk for PE.                  Critical Care     Performed by: Dick Allen or    Authorized by: Dick Allen  Total critical care time:  minutes  Critical care was necessary to treat or prevent imminent or life-threatening deterioration of the following conditions:   Critical care was time spent personally by me on the following activities: development of treatment plan with patient or surrogate, discussions with consultants, examination of patient, evaluation of patient's response to treatment, obtaining history from patient or surrogate, ordering and performing treatments and interventions, ordering and review of  "laboratory studies, ordering and review of radiographic studies, re-evaluation of patient's condition and monitoring for potential decompensation.  Critical care time was exclusive of separately billable procedures and treating other patients.'    At the conclusion of the encounter I discussed the results of all the tests and the disposition. The questions were answered. The patient or family acknowledged understanding and was agreeable with the care plan.                  MEDICATIONS GIVEN IN THE EMERGENCY DEPARTMENT:  Medications   nitroGLYcerin (NITROSTAT) sublingual tablet 0.4 mg (has no administration in time range)       NEW PRESCRIPTIONS STARTED AT TODAY'S ED VISIT:  New Prescriptions    No medications on file       [facility]     Patient information obtained from: The patient    Use of : N/A     Carri Hauser is a 68 year old female with a pertinent history of HTN, GERD, who presents to this ED via walk-in for evaluation of shortness of breath and chest pain.    The patient woke up tonight due to a sudden onset of sharp left-sided chest pain that radiated to her left shoulder, accompanied with shortness of breath. She now endorses a headache. She had a cardiology visit in August and was reassuring. She felt fine earlier this week. She walks frequently throughout the week. Her blood pressure has been normal and has been compliant with taking her medications. No complaints of black stools, or any other associated symptoms at this time.        REVIEW OF SYSTEMS:  See HPI          MEDICAL HISTORY     History reviewed. No pertinent past medical history.    History reviewed. No pertinent surgical history.         No current outpatient medications on file.          PHYSICAL EXAM     BP (!) 220/107   Pulse 61   Temp 97.5  F (36.4  C) (Oral)   Resp 24   Ht 1.727 m (5' 8\")   Wt 98 kg (216 lb)   SpO2 100%   BMI 32.84 kg/m        PHYSICAL EXAM:   Patient appears unwell, pale, uncomfortable  HEENT: Moist " mucous membranes,  No head trauma.    Cardiovascular: Normal rate, normal rhythm, no extremity edema.  No appreciable murmur.  Respiratory: mild mild conversational dyspnea as well as some visible shortness of breath, lungs are clear to auscultation bilaterally with no wheezes rhonchi or rales.  Abdominal: Soft, nontender, nondistended, no palpable masses, no guarding, no rebound  Musculoskeletal: Full range of motion of joints, no deformities appreciated.  Neurological: Alert and oriented, grossly neurologically intact.  Psychological: Normal affect and mood.  Integument: No rashes appreciated          RESULTS       Labs Ordered and Resulted from Time of ED Arrival to Time of ED Departure - No data to display    CTA Chest Abdomen Pelvis w Contrast    (Results Pending)                     PROCEDURES:  Procedures:  Procedures       IBronson am serving as a scribe to document services personally performed by Dick Allen DO, based on my observations and the provider's statements to me.  IDick DO, attest that Bronson Arita is acting in a scribe capacity, has observed my performance of the services and has documented them in accordance with my direction.    Dick Allen DO  Emergency Medicine  [facility] EMERGENCY ROOM       Dick Allen DO  09/20/24 0644

## 2024-09-16 NOTE — PROGRESS NOTES
[facility]    Medicine Progress Note - Hospitalist Service    Date of Admission:  9/16/2024    Addendum: 9/16/24 1230  Contacted by Nurse Gregg regarding EKG results.  Reviewed EKG which reveals diffuse ST elevation in precordial and limb leads.  Patient's symptoms and lab findings most consistent with pericarditis.  Awaiting Echocardiogram results.  Continue ibuprofen 600 mg po TID, will titrate over 3 weeks.  Start colchicine 0.6 mg po BID for 3 months.  Discussed with Dr. Molina from cardiology.     Elgin Trotter DO, MS  [facility]  Internal Medicine    Assessment & Plan   Carri Hauser is a 68 year old female admitted on 9/16/2024. She is a 68 y.o female with medical history significant for paroxysmal atrial fibrillation status post ablation on chronic anticoagulation,  CAD, hypothyroidism, obesity dependent edema secondary to amlodipine, hypertension, hyperlipidemia, obesity and hepatic steatosis who presented to the ED with complaints of chest pain.    Chest pain: atypical.   Pleuritic.   ? Pericarditis.   - normal stress test in April 2024 and also had a normal echocardiogram in July 2024.  - serial troponin T normal 6, 7.   - CTA chest, abdomen, pelvis. No anneurysm, dissection.  No PE.  Small pericardial effusion.   - EKG sinus rhythm, no ischemic changes.   - Continue cardiac monitoring.   - start ASA 81 mg daily.   - order CRP  - order limited echocardiogram.      Left lower lung nodule.   Left anterior lower lung nodule 10x7.7 mm.   Recommend repeat CT in 6 to 12 months.     Hypertensive urgency:   Blood pressure improved with fentanyl and nitro.    PTA lisinopril 10 mg daily.      Acute respiratory failure with hypoxia:   Secondary to opiates.    Received fentanyl in the ED with O2 saturation dropped to 70%.       Hypothyroidism  Synthroid not on home med list.      Hyperlipidemia  PTA rosuvastatin             Observation Goals: List all goals to be met  "before discharge home: , - Serial troponins and stress test complete., - Seen and cleared by consultant if applicable, - Adequate pain control on oral analgesia, - Vital signs normal or at patient baseline, - Safe disposition plan has been identified, - Nurse to notify provider when observation goals have been met and patient is ready for discharge.  Diet: NPO for Medical/Clinical Reasons Except for: Meds, Ice Chips    DVT Prophylaxis: Low Risk/Ambulatory with no VTE prophylaxis indicated  Villafana Catheter: Not present  Lines: None     Cardiac Monitoring: ACTIVE order. Indication: Chest pain/ ACS rule out (24 hours)  Code Status: Full Code      Clinically Significant Risk Factors Present on Admission                  # Hypertension: Noted on problem list         # Obesity: Estimated body mass index is 32.84 kg/m  as calculated from the following:    Height as of this encounter: 1.727 m (5' 8\").    Weight as of this encounter: 98 kg (216 lb).                    Disposition Plan     Medically Ready for Discharge: Anticipated Today             Elgin Trotter DO  Hospitalist Service  [facility]  Securely message with [employer] (more info)  Text page via [email or web address] Paging/Directory   ______________________________________________________________________    Interval History   Patient reports pain with inspiration.  Denies fever or chills.  Had nausea and vomiting earlier this morning.     Physical Exam   Vital Signs: Temp: 98  F (36.7  C) Temp src: Oral BP: (!) 148/69 Pulse: 73   Resp: 17 SpO2: 97 % O2 Device: None (Room air) Oxygen Delivery: 2 LPM  Weight: 216 lbs 0 oz    General: NAD   HEENT: NC/AT, pupils are equal and round, anicteric, oral mucosa is moist.  Neck: Supple, JVP  Cardiac: RRR.  No murmur. No rub or gallop.  Worsening chest discomfort with leaning forward.  Respiratory: CTAB, no crackles, wheezes, rales, or rhonchi  Abdomen: Soft, NT, ND, + bowel sounds  Extremity: Trace LE edema, " pulses palpable.   Neuro: AAO x3,   Psych: Calm and cooperative.     Medical Decision Making       40 MINUTES SPENT BY ME on the date of service doing chart review, history, exam, documentation & further activities per the note.      Data     I have personally reviewed the following data over the past 24 hrs:    10.6  \   12.5   / 215     145 106 14.1 /  127 (H)   3.8 25 0.8 \     ALT: 14 AST: 16 AP: 72 TBILI: 0.4   ALB: 4.2 TOT PROTEIN: 7.2 LIPASE: N/A     Trop: <6 BNP: 152       Imaging results reviewed over the past 24 hrs:   Recent Results (from the past 24 hour(s))   CTA Chest Abdomen Pelvis w Contrast    Narrative    EXAM: CTA CHEST ABDOMEN PELVIS W CONTRAST  LOCATION: [facility]  DATE: 9/16/2024    INDICATION: CHEST PAIN WITH RADIATION INTO BACK  COMPARISON: 8/24/2023  TECHNIQUE: CT angiogram chest abdomen pelvis during arterial phase of injection of IV contrast. 2D and 3D MIP reconstructions were performed by the CT technologist. Dose reduction techniques were used.   CONTRAST: 100ml Isovue 370    FINDINGS:   CT ANGIOGRAM CHEST, ABDOMEN, AND PELVIS: No evidence for ascending thoracic aortic aneurysm. No evidence for thoracic aortic dissection. Branch vessels of the aortic arch are patent without evidence for hemodynamically significant stenosis. No pulmonary   embolism. No evidence for right ventricular heart failure. The abdominal aorta is normal in caliber without evidence for abdominal aortic aneurysm. Branch vessels of the abdominal aorta are patent without evidence for hemodynamically significant   stenosis. The iliac arteries of the pelvis are patent without evidence for hemodynamically significant stenosis.    LUNGS AND PLEURA: Dependent atelectasis in the posterior lung bases. 10 x 7.7 mm nodule in the left anterior lower lung. Otherwise, Lungs are clear. No pleural effusions.    MEDIASTINUM/AXILLAE: No lymphadenopathy. No thoracic aortic aneurysms. Small pericardial  effusion. Esophagus appears within normal limits. Heart size and pulmonary vascularity within normal limits. Heterogeneous appearance of the thyroid gland suggestive   of multinodular goiter.    CORONARY ARTERY CALCIFICATION: Mild.    HEPATOBILIARY: Postcholecystectomy changes. Liver otherwise unremarkable.    PANCREAS: No significant mass, duct dilatation, or inflammatory change.    SPLEEN: Normal.    ADRENAL GLANDS: Normal.    KIDNEYS/BLADDER: No significant mass, stone, or hydronephrosis.    BOWEL: Nonspecific nonobstructive bowel gas pattern. Cecum extends into the midline lower pelvis appendix not seen. No definite CT evidence for appendicitis.    LYMPH NODES: No lymphadenopathy.    PELVIC ORGANS: No pelvic masses.    MUSCULOSKELETAL: Mild thoracic and lumbar spondylosis. No inguinal or ventral hernias.      Impression    IMPRESSION:  1.  No evidence for thoracic or abdominal aortic aneurysm or dissection.  2.  Branch vessels emanating from the thoracic and abdominal aorta remain patent without evidence for hemodynamically significant stenosis.  3.  No pulmonary embolism.  4.  Small pericardial effusion.  5.  10 x 7.7 mm nodule left anterior lower lung.  6.  Heterogeneous appearance to the thyroid gland suggestive of multinodular goiter.  7.  Nonspecific nonobstructive bowel gas pattern.

## 2024-09-16 NOTE — H&P
[facility]    History and Physical - Hospitalist Service       Date of Admission:  9/16/2024    Assessment & Plan      Carri Hauser is a 68 year old female admitted on 9/16/2024. She is a 68 y.o female with medical history significant for paroxysmal atrial fibrillation on chronic anticoagulation ASCVD,, dependent edema secondary to amlodipine, hypertension, hyperlipidemia, hypothyroidism, hypothyroidism, fracture obesity,  and hepatic steatosis who presented to the ED with complaints of chest pain that woke her up around 2:30 AM today.     In the ED, troponin and EKG were negative.  CT PE protocol was negative for PE.  CT showed small pericardial effusion.  Showed multinodular  thyroid.  BNP was 152.  There was no evidence of thoracic or abdominal aortic aneurysm or dissection.    # Chest pain: Unstable angina versus pericarditis vs 2/2 hypertensive emergency.  Patient presented to the ED with complaints of chest pain that woke her up from sleep.  Systolic pressure on presentation was 220 with diastolic blood pressure of 107.  Blood pressures in the ED has improved with systolic down to 143 and diastolic down to 73.  Of note, patient had a normal stress test in April 2024 and also had a normal echocardiogram in July 2024.  EKG in the ED was fairly unremarkable.  Telemonitoring  Repeat troponin and EKG  Cardiology consult    # Hypertensive emergency: Patient presented to the ED with complaints of chest pain.  Her systolic blood pressure of 220 with diastolic pressure of 107.  Blood pressure improved with fentanyl and nitro.  Patient has associated chest pain.  O2 saturation dropped into the 70s and 80s with fentanyl.  Telemonitoring  Resume lisinopril  Cautious use of opiates    # Acute respiratory failure with hypoxia: Secondary to opiates.  Received fentanyl in the ED with O2 saturation dropped to 70%.  Has improved with supplemental oxygen.  She has been able to wean off  "oxygen.  Telemonitoring    # Hypothyroidism  Continue Synthroid    # Hyperlipidemia  Continue rosuvastatin       Observation Goals: List all goals to be met before discharge home: , - Serial troponins and stress test complete., - Seen and cleared by consultant if applicable, - Adequate pain control on oral analgesia, - Vital signs normal or at patient baseline, - Safe disposition plan has been identified, - Nurse to notify provider when observation goals have been met and patient is ready for discharge.  Diet: NPO for Medical/Clinical Reasons Except for: Meds, Ice Chips    DVT Prophylaxis: Pneumatic Compression Devices  Villafana Catheter: Not present  Lines: None     Cardiac Monitoring: ACTIVE order. Indication: Chest pain/ ACS rule out (24 hours)  Code Status: Full Code      Clinically Significant Risk Factors Present on Admission                          # Obesity: Estimated body mass index is 32.84 kg/m  as calculated from the following:    Height as of this encounter: 1.727 m (5' 8\").    Weight as of this encounter: 98 kg (216 lb).                    Disposition Plan     Medically Ready for Discharge: Anticipated Tomorrow           Miguel Collado MD  Hospitalist Service  [facility]  Securely message with [facility] [employer] (more info)  Text page via [facility] Paging/Directory     ______________________________________________________________________    Chief Complaint   Chest pain and headache.     History is obtained from the patient    History of Present Illness   Carri Hauser is a 68 year old female  with medical history significant for paroxysmal atrial fibrillation on chronic anticoagulation ASCVD,, dependent edema secondary to amlodipine, hypertension, hyperlipidemia, hypothyroidism, hypothyroidism, fracture obesity,  and hepatic steatosis who presented to the ED with complaints of chest pain that woke her up around 2:30 AM today.  Patient reports that she had excruciating, 10/10, left-sided " chest pain that radiated to her left shoulder and left upper arm.  She reports that when pain started, she could feel the pain in her left neck and also into her back.  She reports associated shortness of breath and nausea with small emesis.  Also reports associated headache.  She reports that at home, his systolic blood pressure was 170s/101.  Decided to come to the ED.  She denies any fevers, chills, vision changes, diarrhea,, constipation, dysuria, hematuria, or any other new symptoms.    In the ED, troponin and EKG were negative.  CT PE protocol was negative for PE.  CT showed small pericardial effusion.  Showed multinodular  thyroid.  BNP was 152.  There was no evidence of thoracic or abdominal aortic aneurysm or dissection.      Past Medical History    History reviewed. No pertinent past medical history.    Past Surgical History   History reviewed. No pertinent surgical history.    Prior to Admission Medications   None        Review of Systems    The 12 point Review of Systems is negative other than noted in the HPI.     Physical Exam   Vital Signs: Temp: 97.5  F (36.4  C) Temp src: Oral BP: (!) 147/72 Pulse: 76   Resp: 21 SpO2: 97 % O2 Device: None (Room air)    Weight: 216 lbs 0 oz    General: AAOx3. Moderate distress. pleasant.  HEENT: NCAT, pupils are equal and round, anicteric, oral mucosa is moist.  Neck: Supple, JVP  Cardiac: RRR.  No murmur. No rub or gallop.  Worsening chest discomfort with leaning forward.  Respiratory: CTAB, no crackles, wheezes, rales, or rhonchi  Abdomen: Soft, NT, ND, + bowel sounds  Extremity: Trace LE edema, pulses palpable.   Neuro: AAO x3,   Psych: Calm and cooperative.       Medical Decision Making       About 60 MINUTES SPENT BY ME on the date of service doing chart review, history, exam, documentation & further activities per the note.      Data     I have personally reviewed the following data over the past 24 hrs:    10.6  \   12.5   / 215     145 106 14.1 /  127 (H)    3.8 25 0.8 \     ALT: 14 AST: 16 AP: 72 TBILI: 0.4   ALB: 4.2 TOT PROTEIN: 7.2 LIPASE: N/A     Trop: 6 BNP: 152       Imaging results reviewed over the past 24 hrs:   Recent Results (from the past 24 hour(s))   CTA Chest Abdomen Pelvis w Contrast    Narrative    EXAM: CTA CHEST ABDOMEN PELVIS W CONTRAST  LOCATION: [facility]  DATE: 9/16/2024    INDICATION: CHEST PAIN WITH RADIATION INTO BACK  COMPARISON: 8/24/2023  TECHNIQUE: CT angiogram chest abdomen pelvis during arterial phase of injection of IV contrast. 2D and 3D MIP reconstructions were performed by the CT technologist. Dose reduction techniques were used.   CONTRAST: 100ml Isovue 370    FINDINGS:   CT ANGIOGRAM CHEST, ABDOMEN, AND PELVIS: No evidence for ascending thoracic aortic aneurysm. No evidence for thoracic aortic dissection. Branch vessels of the aortic arch are patent without evidence for hemodynamically significant stenosis. No pulmonary   embolism. No evidence for right ventricular heart failure. The abdominal aorta is normal in caliber without evidence for abdominal aortic aneurysm. Branch vessels of the abdominal aorta are patent without evidence for hemodynamically significant   stenosis. The iliac arteries of the pelvis are patent without evidence for hemodynamically significant stenosis.    LUNGS AND PLEURA: Dependent atelectasis in the posterior lung bases. 10 x 7.7 mm nodule in the left anterior lower lung. Otherwise, Lungs are clear. No pleural effusions.    MEDIASTINUM/AXILLAE: No lymphadenopathy. No thoracic aortic aneurysms. Small pericardial effusion. Esophagus appears within normal limits. Heart size and pulmonary vascularity within normal limits. Heterogeneous appearance of the thyroid gland suggestive   of multinodular goiter.    CORONARY ARTERY CALCIFICATION: Mild.    HEPATOBILIARY: Postcholecystectomy changes. Liver otherwise unremarkable.    PANCREAS: No significant mass, duct dilatation, or  inflammatory change.    SPLEEN: Normal.    ADRENAL GLANDS: Normal.    KIDNEYS/BLADDER: No significant mass, stone, or hydronephrosis.    BOWEL: Nonspecific nonobstructive bowel gas pattern. Cecum extends into the midline lower pelvis appendix not seen. No definite CT evidence for appendicitis.    LYMPH NODES: No lymphadenopathy.    PELVIC ORGANS: No pelvic masses.    MUSCULOSKELETAL: Mild thoracic and lumbar spondylosis. No inguinal or ventral hernias.      Impression    IMPRESSION:  1.  No evidence for thoracic or abdominal aortic aneurysm or dissection.  2.  Branch vessels emanating from the thoracic and abdominal aorta remain patent without evidence for hemodynamically significant stenosis.  3.  No pulmonary embolism.  4.  Small pericardial effusion.  5.  10 x 7.7 mm nodule left anterior lower lung.  6.  Heterogeneous appearance to the thyroid gland suggestive of multinodular goiter.  7.  Nonspecific nonobstructive bowel gas pattern.

## 2024-09-16 NOTE — CONSULTS
[facility] team is asked to see Carri Hauser in consultation to evaluate Unstable angina vs. pericarditis .      Assessment:     Acute onset pleuritic chest pain with no objective evidence of myocardial infarction.  Recent stress test for similar discomfort reportedly negative.  Troponins and ECGs normal suggesting no acute coronary syndrome.  No ECG findings of acute pericarditis.  Liver function tests normal, not suggestive of acute cholecystitis.  Suspect musculoskeletal etiology of discomfort, will try ibuprofen 600 mg 3 times daily for 7 days.   Hypertension, improving on home regimen.     Plan:     Ibuprofen 600 mg 3 times daily with meals including first dose now.  Ambulate once pain relieved with potential discharge later today.     Current History:     Carri Hauser is a 68-year-old woman with a history of hypertension, paroxysmal atrial fibrillation status post ablation in 2016, PVCs, and mixed hyperlipidemia.  She presented earlier this year for pleuritic substernal chest pain.  Pharmacologic nuclear stress test at [facility] showed no evidence of inducible ischemia with a normal ejection fraction.  Holter monitor showed 2.5% PVC burden, but was not started on beta-blockers which she has tolerated previously.  She reports blood pressures at home typically run about 111/68    She has generally been active, walking 3 miles 4 times a week without difficulty.  She was last able to do this 2 days ago.  Yesterday she had a typical day with no unusual activity or dietary intake.  She has had no fevers or chills or night sweats.  She awakened at 2 AM with severe pleuritic chest pain, substernal in location but radiating into the left shoulder.  This was worsened with breathing or with laying back.  It is more intense than discomfort she has noted previously, but similar in nature.  She took 324 mg of aspirin and presented to the emergency room for further evaluation.  The pain  "persists, though perhaps less intense than when she arrived.    Past Medical History:   History reviewed. No pertinent past medical history.  Sleep history: Snores, no apnea noted no daytime sleepiness.  No early restorative  Past Surgical History:   History reviewed. No pertinent surgical history.    Family History:   No family history on file.      Social History:      Exercise history: Walks 3 miles 4 days a week without difficulty    Meds:     Current Outpatient Medications   Medication Sig Dispense Refill    lisinopril (ZESTRIL) 10 MG tablet Take 10 mg by mouth daily.      rosuvastatin (CRESTOR) 10 MG tablet Take 10 mg by mouth daily.      triamcinolone (KENALOG) 0.1 % external cream Apply topically 3 times daily as needed for irritation.         Allergies:   Pseudoephedrine, Tetracycline, Adhesive tape, and Tylenol [acetaminophen]    Review of Systems:   A 12 point comprehensive review of systems was negative except as noted in the current history.    Objective:      Physical Exam  Wt Readings from Last 3 Encounters:   09/16/24 98 kg (216 lb)   08/24/23 95.7 kg (211 lb)     Body mass index is 32.84 kg/m .  BP (!) 148/69   Pulse 73   Temp 98  F (36.7  C) (Oral)   Resp 17   Ht 1.727 m (5' 8\")   Wt 98 kg (216 lb)   SpO2 97%   BMI 32.84 kg/m      General Appearance:  No apparent distress.  HEENT: No scleral icterus; the mucous membranes were pink and moist.  Conjunctiva not injected  Neck:  No cervical bruits, jugular venous distention, or thyromegaly.  Chest:The spine was straight. The chest was symmetric.  No tenderness to palpation.  Lungs:  Respirations unlabored; the lungs are clear to auscultation.  Cardiovascular:    Nonpalpable point of maximal impulse. Auscultation reveals regular first and second heart sounds with no murmurs, rubs, or gallops. Carotid, radial, and dorsalis pedal pulses are intact and symmetric.  Occasional extrasystoles.  Abdomen: Rounded.  No organomegaly, masses, bruits, or " "tenderness. Bowels sounds are present  Extremities: No cyanosis, clubbing, or edema.  Skin:  No xanthelasma.  Neurologic: Mood and affect are appropriate. Speech is fluent.      EKG (personally reviewed): 9/16/2024.  Normal sinus rhythm with PACs, aberrant conduction.  Normal ECG    Imaging   CTA chest abdomen pelvis PE run today:CORONARY ARTERY CALCIFICATION: Mild.   1.  No evidence for thoracic or abdominal aortic aneurysm or dissection.  2.  Branch vessels emanating from the thoracic and abdominal aorta remain patent without evidence for hemodynamically significant stenosis.  3.  No pulmonary embolism.  4.  Small pericardial effusion.  5.  10 x 7.7 mm nodule left anterior lower lung.  6.  Heterogeneous appearance to the thyroid gland suggestive of multinodular goiter.  7.  Nonspecific nonobstructive bowel gas pattern.      Cardiac diagnostics    Pharmacologic nuclear MPI stress test 4/2024 [facility]:   Normal pharmacologic stress perfusion imaging study.    No significant ischemia was suggested by this study.    Normal left ventricular size and systolic function with a calculated LVEF of 68%.    Premature ventricular contractions    Compared to the previous report from July 2020, no significant changes          Lab Results    Chemistry/lipid CBC Cardiac Enzymes/BNP/TSH/INR   No results for input(s): \"CHOL\", \"HDL\", \"LDL\", \"TRIG\", \"CHOLHDLRATIO\" in the last 65681 hours.  No results for input(s): \"LDL\" in the last 05545 hours.  Recent Labs   Lab Test 09/16/24 0432 09/16/24 0408   NA  --  145   POTASSIUM  --  3.8   CHLORIDE  --  106   CO2  --  25   GLC  --  127*   BUN  --  14.1   CR 0.8 0.79   GFRESTIMATED >60 82   SHRUTHI  --  9.3     Recent Labs   Lab Test 09/16/24 0432 09/16/24 0408 08/24/23  1034   CR 0.8 0.79 0.79     No results for input(s): \"A1C\" in the last 32097 hours.       Recent Labs   Lab Test 09/16/24 0408   WBC 10.6   HGB 12.5   HCT 37.7   MCV 85        Recent Labs   Lab Test 09/16/24 0408 " "08/24/23  1034   HGB 12.5 13.5    No results for input(s): \"TROPONINI\" in the last 91717 hours.  Recent Labs   Lab Test 09/16/24  0408   NTBNPI 152     No results for input(s): \"TSH\" in the last 13651 hours.  No results for input(s): \"INR\" in the last 00583 hours.         Trip Molina MD  [facility]  9/16/2024    Note created using Dragon voice recognition software.  Sound alike errors may have escaped editing.    Clinically Significant Risk Factors Present on Admission                  # Obesity: Estimated body mass index is 32.84 kg/m  as calculated from the following:    Height as of this encounter: 1.727 m (5' 8\").    Weight as of this encounter: 98 kg (216 lb).                                  "

## 2024-09-16 NOTE — ED NOTES
O2 sats dropped. Pt encouraged to breath, nasal canula placed. O2 set at 2 lpm. O2 bumped to 3 lpm.

## 2024-09-16 NOTE — PROGRESS NOTES
Writer met with patient to discuss and complete VIDAL paperwork. Writer answered any of patient's questions regarding insurance coverage. Writer encouraged patient to follow up with their insurance plan directly to receive the most accurate information. Patient signed VIDAL form and the form was placed on the patient's chart. A copy of the signed VIDAL was offered to patient, which was accepted.     MICHELINE Rosado

## 2024-09-16 NOTE — PHARMACY-ADMISSION MEDICATION HISTORY
Pharmacy Intern Admission Medication History    Admission medication history is complete. The information provided in this note is only as accurate as the sources available at the time of the update.    Information Source(s): Patient via in-person    Changes made to PTA medication list:  Added: all meds to PTA list.  Triamcinolone to be brought from home if needed.   Deleted: None  Changed: None    Allergies reviewed with patient and updates made in EHR: yes    Medication History Completed By: Sirisha Ervin 9/16/2024 7:55 AM    PTA Med List   Medication Sig Last Dose    lisinopril (ZESTRIL) 10 MG tablet Take 10 mg by mouth daily. 9/15/2024 at pm    rosuvastatin (CRESTOR) 10 MG tablet Take 10 mg by mouth daily. 9/15/2024 at pm    triamcinolone (KENALOG) 0.1 % external cream Apply topically 3 times daily as needed for irritation.  at prn - will use home supply     Thank you,  Sirisha Ervin, Student Pharmacist  [facility]

## 2024-09-17 VITALS
DIASTOLIC BLOOD PRESSURE: 77 MMHG | TEMPERATURE: 98.5 F | BODY MASS INDEX: 32.72 KG/M2 | HEART RATE: 80 BPM | SYSTOLIC BLOOD PRESSURE: 142 MMHG | HEIGHT: 68 IN | RESPIRATION RATE: 18 BRPM | WEIGHT: 215.9 LBS | OXYGEN SATURATION: 95 %

## 2024-09-17 PROCEDURE — 99239 HOSP IP/OBS DSCHRG MGMT >30: CPT | Performed by: INTERNAL MEDICINE

## 2024-09-17 PROCEDURE — 250N000013 HC RX MED GY IP 250 OP 250 PS 637: Performed by: INTERNAL MEDICINE

## 2024-09-17 PROCEDURE — 99232 SBSQ HOSP IP/OBS MODERATE 35: CPT | Performed by: INTERNAL MEDICINE

## 2024-09-17 RX ORDER — PANTOPRAZOLE SODIUM 40 MG/1
40 TABLET, DELAYED RELEASE ORAL
Status: DISCONTINUED | OUTPATIENT
Start: 2024-09-17 | End: 2024-09-17 | Stop reason: HOSPADM

## 2024-09-17 RX ORDER — COLCHICINE 0.6 MG/1
0.6 TABLET ORAL 2 TIMES DAILY
Qty: 60 TABLET | Refills: 2 | Status: SHIPPED | OUTPATIENT
Start: 2024-09-17

## 2024-09-17 RX ORDER — METOPROLOL SUCCINATE 25 MG/1
25 TABLET, EXTENDED RELEASE ORAL DAILY
Qty: 30 TABLET | Refills: 1 | Status: SHIPPED | OUTPATIENT
Start: 2024-09-17

## 2024-09-17 RX ORDER — METOPROLOL SUCCINATE 25 MG/1
25 TABLET, EXTENDED RELEASE ORAL DAILY
Status: DISCONTINUED | OUTPATIENT
Start: 2024-09-17 | End: 2024-09-17 | Stop reason: HOSPADM

## 2024-09-17 RX ORDER — IBUPROFEN 600 MG/1
TABLET, FILM COATED ORAL
Qty: 49 TABLET | Refills: 0 | Status: SHIPPED | OUTPATIENT
Start: 2024-09-17 | End: 2024-10-15

## 2024-09-17 RX ORDER — PANTOPRAZOLE SODIUM 40 MG/1
40 TABLET, DELAYED RELEASE ORAL
Qty: 30 TABLET | Refills: 0 | Status: SHIPPED | OUTPATIENT
Start: 2024-09-18

## 2024-09-17 RX ORDER — IBUPROFEN 600 MG/1
600 TABLET, FILM COATED ORAL
Status: DISCONTINUED | OUTPATIENT
Start: 2024-09-17 | End: 2024-09-17 | Stop reason: HOSPADM

## 2024-09-17 RX ORDER — AMLODIPINE BESYLATE 5 MG/1
5 TABLET ORAL DAILY
Status: DISCONTINUED | OUTPATIENT
Start: 2024-09-17 | End: 2024-09-17

## 2024-09-17 RX ADMIN — IBUPROFEN 600 MG: 600 TABLET ORAL at 08:47

## 2024-09-17 RX ADMIN — PANTOPRAZOLE SODIUM 40 MG: 40 TABLET, DELAYED RELEASE ORAL at 08:47

## 2024-09-17 RX ADMIN — COLCHICINE 0.6 MG: 0.6 TABLET ORAL at 08:47

## 2024-09-17 ASSESSMENT — ACTIVITIES OF DAILY LIVING (ADL)
ADLS_ACUITY_SCORE: 22

## 2024-09-17 NOTE — DISCHARGE SUMMARY
"[facility]  Hospitalist Discharge Summary      Date of Admission:  9/16/2024  Date of Discharge:  9/17/2024  Discharging Provider: Elgin Trotter DO  Discharge Service: Hospitalist Service    Discharge Diagnoses   Acute idiopathic pericarditis  Left lower lung nodule.   Hypertensive urgency:   Acute respiratory failure with hypoxia:   Hypothyroidism  Hyperlipidemia    Clinically Significant Risk Factors     # Obesity: Estimated body mass index is 32.83 kg/m  as calculated from the following:    Height as of this encounter: 1.727 m (5' 8\").    Weight as of this encounter: 97.9 kg (215 lb 14.4 oz).       Follow-ups Needed After Discharge   Follow-up Appointments     Follow-up and recommended labs and tests       Follow up with primary care provider, Ruba Mendez, within 7 days for   hospital follow- up.  The following labs/tests are recommended: BMP, CRP,   and 6 month CT chest for incidental lung nodule follow up.    Follow up with [facility] Cardiology clinic in 1 month, patient to call and   schedule appointment.          Discharge Disposition   Discharged to home  Condition at discharge: Stable    Hospital Course   Carri Hauser is a 68 year old female admitted on 9/16/2024. She is a 68 y.o female with medical history significant for paroxysmal atrial fibrillation status post ablation on chronic anticoagulation,  CAD, hypothyroidism, obesity dependent edema secondary to amlodipine, hypertension, hyperlipidemia, obesity and hepatic steatosis who presented to the ED with complaints of chest pain.  Reviewed EKG which revealed diffuse ST elevation in precordial and limb leads.  CRP was elevated to 6.  Tropon T levels were normal.  Echocardiogram revealed normal LVEF, no pericardial effusion and no WMA.  Patient's symptoms and lab findings most consistent with pericarditis.  Started on ibuprofen and colchicine with resolution of chest pain complaints.   Discussed with Dr. Molina from " cardiology.     Patient had incidental finding of left lower lung nodule.  Recommending repeat CT chest in 6 months.     Consultations This Hospital Stay   CARDIOLOGY IP CONSULT    Code Status   Full Code    Time Spent on this Encounter   I, Elgin Trotter DO, personally saw the patient today and spent greater than 30 minutes discharging this patient.       Elgin Trotter DO  [facility]  [address] [address]  Phone: [phone]  Fax: [phone]  ______________________________________________________________________    Physical Exam   Vital Signs: Temp: 98.5  F (36.9  C) Temp src: Oral BP: (!) 142/77 Pulse: 80   Resp: 18 SpO2: 95 % O2 Device: None (Room air)    Weight: 215 lbs 14.4 oz  General: NAD   HEENT: NC/AT, pupils are equal and round, anicteric, oral mucosa is moist.  Neck: Supple, JVP  Cardiac: RRR.  No murmur. No rub or gallop.  Worsening chest discomfort with leaning forward.  Respiratory: CTAB, no crackles, wheezes, rales, or rhonchi  Abdomen: Soft, NT, ND, + bowel sounds  Extremity: Trace LE edema, pulses palpable.   Neuro: AAO x3,   Psych: Calm and cooperative.        Primary Care Physician   Ruba Mendez    Discharge Orders      Reason for your hospital stay    Acute idiopathic pericarditis.     Follow-up and recommended labs and tests     Follow up with primary care provider, Ruba Mendez, within 7 days for hospital follow- up.  The following labs/tests are recommended: BMP, CRP.    Follow up with [facility] clinic in 1 month, patient to call and schedule appointment.     Activity    Your activity upon discharge: activity as tolerated     When to contact your care team    Call your primary doctor if you have any of the following: increased chest pain, shortness of breath, palpitations.     Diet    Follow this diet upon discharge: Current Diet:Orders Placed This Encounter      Regular Diet Adult       Significant  Results and Procedures   Most Recent 3 CBC's:  Recent Labs   Lab Test 09/16/24  0408 08/24/23  1034   WBC 10.6 7.9   HGB 12.5 13.5   MCV 85 86    257     Most Recent 3 BMP's:  Recent Labs   Lab Test 09/16/24  0432 09/16/24  0408 08/24/23  1034   NA  --  145 141   POTASSIUM  --  3.8 4.2   CHLORIDE  --  106 107   CO2  --  25 21*   BUN  --  14.1 14   CR 0.8 0.79 0.79   ANIONGAP  --  14 13   SHRUTHI  --  9.3 9.8   GLC  --  127* 112     Most Recent 2 LFT's:  Recent Labs   Lab Test 09/16/24  0408 08/24/23  1034   AST 16 14   ALT 14 17   ALKPHOS 72 76   BILITOTAL 0.4 0.8     Most Recent 3 Troponin's:No lab results found.  Most Recent ESR & CRP:  Recent Labs   Lab Test 09/16/24  0812   CRPI 6.04*   ,   Results for orders placed or performed during the hospital encounter of 09/16/24   CTA Chest Abdomen Pelvis w Contrast    Narrative    EXAM: CTA CHEST ABDOMEN PELVIS W CONTRAST  LOCATION: [facility]  DATE: 9/16/2024    INDICATION: CHEST PAIN WITH RADIATION INTO BACK  COMPARISON: 8/24/2023  TECHNIQUE: CT angiogram chest abdomen pelvis during arterial phase of injection of IV contrast. 2D and 3D MIP reconstructions were performed by the CT technologist. Dose reduction techniques were used.   CONTRAST: 100ml Isovue 370    FINDINGS:   CT ANGIOGRAM CHEST, ABDOMEN, AND PELVIS: No evidence for ascending thoracic aortic aneurysm. No evidence for thoracic aortic dissection. Branch vessels of the aortic arch are patent without evidence for hemodynamically significant stenosis. No pulmonary   embolism. No evidence for right ventricular heart failure. The abdominal aorta is normal in caliber without evidence for abdominal aortic aneurysm. Branch vessels of the abdominal aorta are patent without evidence for hemodynamically significant   stenosis. The iliac arteries of the pelvis are patent without evidence for hemodynamically significant stenosis.    LUNGS AND PLEURA: Dependent atelectasis in the posterior  lung bases. 10 x 7.7 mm nodule in the left anterior lower lung. Otherwise, Lungs are clear. No pleural effusions.    MEDIASTINUM/AXILLAE: No lymphadenopathy. No thoracic aortic aneurysms. Small pericardial effusion. Esophagus appears within normal limits. Heart size and pulmonary vascularity within normal limits. Heterogeneous appearance of the thyroid gland suggestive   of multinodular goiter.    CORONARY ARTERY CALCIFICATION: Mild.    HEPATOBILIARY: Postcholecystectomy changes. Liver otherwise unremarkable.    PANCREAS: No significant mass, duct dilatation, or inflammatory change.    SPLEEN: Normal.    ADRENAL GLANDS: Normal.    KIDNEYS/BLADDER: No significant mass, stone, or hydronephrosis.    BOWEL: Nonspecific nonobstructive bowel gas pattern. Cecum extends into the midline lower pelvis appendix not seen. No definite CT evidence for appendicitis.    LYMPH NODES: No lymphadenopathy.    PELVIC ORGANS: No pelvic masses.    MUSCULOSKELETAL: Mild thoracic and lumbar spondylosis. No inguinal or ventral hernias.      Impression    IMPRESSION:  1.  No evidence for thoracic or abdominal aortic aneurysm or dissection.  2.  Branch vessels emanating from the thoracic and abdominal aorta remain patent without evidence for hemodynamically significant stenosis.  3.  No pulmonary embolism.  4.  Small pericardial effusion.  5.  10 x 7.7 mm nodule left anterior lower lung.  6.  Heterogeneous appearance to the thyroid gland suggestive of multinodular goiter.  7.  Nonspecific nonobstructive bowel gas pattern.     Echocardiogram Limited     Value    LVEF  60-65%    [facility]    [id number]  [id number]  [id number]  [id number]^[place]IF^[place] [address]     Name: JEFF BOCANEGRA  MRN: [id number]  : 1955  Study Date: 2024 01:20 PM  Age: 68 yrs  Gender: Female  Patient Location: [facility]  Reason For Study: Chest Pain  Ordering Physician: CORINA DISLA  Performed By: EM      BSA: 2.1 m2  Height: 68 in  Weight: 216 lb  HR: 78  BP: 148/69 mmHg  ______________________________________________________________________________  Procedure  Limited Portable Echo Adult.  ______________________________________________________________________________  Interpretation Summary     Left ventricular size, wall motion and function are normal. The ejection  fraction is 60-65%.  Normal right ventricle size and systolic function.  There is no pericardial effusion.  Aortic root and ascending dilatation is present.  IVC diameter >2.1 cm collapsing <50% with sniff suggests a high RA pressure  estimated at 15 mmHg or greater.  No hemodynamically significant valvular abnormalities on 2D or color flow  imaging.  ______________________________________________________________________________  Left Ventricle  Left ventricular size, wall motion and function are normal. The ejection  fraction is 60-65%. There is normal left ventricular wall thickness. No  regional wall motion abnormalities noted.     Right Ventricle  Normal right ventricle size and systolic function.     Atria  Normal left atrial size. Right atrial size is normal. There is no color  Doppler evidence of an atrial shunt.     Mitral Valve  Mitral valve leaflets appear normal. There is no evidence of mitral stenosis  or clinically significant mitral regurgitation.     Tricuspid Valve  Right ventricle systolic pressure estimate normal.     Aortic Valve  Aortic valve leaflets appear normal. There is no evidence of aortic stenosis  or clinically significant aortic regurgitation.     Pulmonic Valve  The pulmonic valve is not well seen, but is grossly normal. This degree of  valvular regurgitation is within normal limits.     Vessels  Aortic root and ascending dilatation is present. IVC diameter >2.1 cm  collapsing <50% with sniff suggests a high RA pressure estimated at 15 mmHg or  greater.     Pericardium  There is no pericardial effusion.      ______________________________________________________________________________  MMode/2D Measurements & Calculations  IVSd: 1.0 cm  LVIDd: 3.8 cm  LVIDs: 2.4 cm  LVPWd: 1.0 cm  FS: 38.5 %     LV mass(C)d: 123.2 grams  LV mass(C)dI: 58.2 grams/m2  Ao root diam: 4.1 cm  asc Aorta Diam: 4.3 cm  LVOT diam: 2.4 cm  LVOT area: 4.5 cm2  Ao root diam index Ht(cm/m): 2.3  Ao root diam index BSA (cm/m2): 1.9  Asc Ao diam index BSA (cm/m2): 2.0  Asc Ao diam index Ht(cm/m): 2.5  RWT: 0.54     ______________________________________________________________________________  Report approved by: Yunior Bain 09/16/2024 02:24 PM             Discharge Medications   Current Discharge Medication List        START taking these medications    Details   colchicine (COLCRYS) 0.6 MG tablet Take 1 tablet (0.6 mg) by mouth 2 times daily.  Qty: 60 tablet, Refills: 2    Associated Diagnoses: Acute idiopathic pericarditis      ibuprofen (ADVIL/MOTRIN) 600 MG tablet Take 1 tablet (600 mg) by mouth 3 times daily (with meals) for 7 days, THEN 1 tablet (600 mg) 2 times daily for 7 days, THEN 1 tablet (600 mg) daily for 14 days.  Qty: 49 tablet, Refills: 0    Associated Diagnoses: Acute idiopathic pericarditis      metoprolol succinate ER (TOPROL XL) 25 MG 24 hr tablet Take 1 tablet (25 mg) by mouth daily.  Qty: 30 tablet, Refills: 1    Associated Diagnoses: Primary hypertension      pantoprazole (PROTONIX) 40 MG EC tablet Take 1 tablet (40 mg) by mouth every morning (before breakfast).  Qty: 30 tablet, Refills: 0    Associated Diagnoses: Acute idiopathic pericarditis           CONTINUE these medications which have NOT CHANGED    Details   lisinopril (ZESTRIL) 10 MG tablet Take 10 mg by mouth daily.      rosuvastatin (CRESTOR) 10 MG tablet Take 10 mg by mouth daily.      triamcinolone (KENALOG) 0.1 % external cream Apply topically 3 times daily as needed for irritation.           Allergies   Allergies   Allergen Reactions    Pseudoephedrine  Other (See Comments)     Other Reaction(s): Tachycardia    Tetracycline Hives    Adhesive Tape Rash     Echocardiogram stickers    Tylenol [Acetaminophen] Rash     All over chest

## 2024-09-17 NOTE — PLAN OF CARE
AO x4. C/o 2/10 pleuritic mid sternal chest pain that does not radiate. Only occurs with deep breaths. Tele shows SR w/ ST elevated to 1.6 mm in Lead 2. Dr. Trotter and Tracy aware per Dr. Thakur note. Other VSS on RA. Tolerating activity. Up independently. Pt able to make needs known. Call light within reach and purposeful rounding performed. Christina Britton RN      Problem: Adult Inpatient Plan of Care  Goal: Optimal Comfort and Wellbeing  Outcome: Progressing     Problem: Fall Injury Risk  Goal: Absence of Fall and Fall-Related Injury  Outcome: Progressing     Problem: Chest Pain  Goal: Resolution of Chest Pain Symptoms  Intervention: Manage Acute Chest Pain  Recent Flowsheet Documentation  Taken 9/16/2024 1551 by Christina Britton, RN  Chest Pain Intervention: cardiac monitoring continued

## 2024-09-17 NOTE — PLAN OF CARE
Problem: Adult Inpatient Plan of Care  Goal: Optimal Comfort and Wellbeing  9/17/2024 0640 by Reyes, Dora, RN  Outcome: Progressing  9/17/2024 0627 by Reyes, Dora, RN  Outcome: Progressing     Problem: Chest Pain  Goal: Resolution of Chest Pain Symptoms  Outcome: Progressing  Intervention: Manage Acute Chest Pain  Recent Flowsheet Documentation  Taken 9/17/2024 0005 by Reyes, Dora, RN  Chest Pain Intervention: cardiac monitoring continued   Goal Outcome Evaluation:      Pt AxO, no acute changes this shift. No distress noted w/assessment, respirations even and unlabored.Discharge plan pending clinical improvement as determined by providers.

## 2024-09-17 NOTE — PROGRESS NOTES
Cardiology Progress Note    Assessment/Plan:    Acute pericarditis in a 68-year-old woman 7 years out from A-fib ablation procedure.  Doubtfully related.  Symptoms are similar to chest discomfort experienced earlier this year suggesting a recurrent pericarditis syndrome.  As such would aggressively treat with anti-inflammatory regimen of colchicine 0.6 twice daily for 1 month, tapering off afterwards, plus ibuprofen 600 mg 3 times daily with meals and subsequent taper.  Needs follow-up in 1 month, either with me or with her previous [facility] cardiologist.  Hypertension with less than ideal control on amlodipine plus lisinopril.  With pericarditis suggesting increased risk for recurrent atrial fibrillation, would favor switching from amlodipine (causing some ankle fluid retention) to metoprolol succinate 25 mg daily.  Patient is agreeable with this approach has previously tolerated metoprolol    Discussed with Dr. Trotter. Stable for discharge home today    Active Problems:    Chest pain, unspecified type    AF (paroxysmal atrial fibrillation) (H)    Hyperlipidemia    HTN (hypertension)    Hypothyroidism (acquired)     LOS: 1 day     Subjective:  Pleuritic substernal chest pain nearly resolved after first dose of ibuprofen, equivocal additional improvement with colchicine.  No diarrhea.  Feels well.  Ambulatory without difficulty.      Objective:   Vital signs in last 24 hours:  Vitals:    09/16/24 1942 09/17/24 0005 09/17/24 0301 09/17/24 0800   BP: 139/75 (!) 151/84 (!) 162/84 (!) 142/77   BP Location: Left arm Left arm Left arm Right arm   Patient Position:       Cuff Size:    Adult Regular   Pulse: 74 78 73 80   Resp: 18 20 18 18   Temp: 97.7  F (36.5  C) 98  F (36.7  C) 98.4  F (36.9  C) 98.5  F (36.9  C)   TempSrc: Oral Oral Oral Oral   SpO2: 96% 95% 93% 95%   Weight:       Height:         Weight:   Wt Readings from Last 3 Encounters:   09/16/24 97.9 kg (215 lb 14.4 oz)   08/24/23 95.7 kg (211 lb)  "          PHYSICAL EXAM      Respiratory:  Normal breath sounds, No respiratory distress, No wheezing, No chest tenderness.   Cardiovascular:   Normal heart rate, Normal rhythm, No murmurs, No rubs, No gallops.   GI: Rounded.  Bowel sounds normal, Soft, No tenderness, No masses  Extremities: Trace pretibial edema       Cardiographics:   Telemetry sinus rhythm, 75 to 90 bpm.  Diffuse ST elevation noted    Echocardiogram yesterday: Limited study  Left ventricular size, wall motion and function are normal. The ejection  fraction is 60-65%.  Normal right ventricle size and systolic function.  There is no pericardial effusion.  Aortic root and ascending dilatation is present.  IVC diameter >2.1 cm collapsing <50% with sniff suggests a high RA pressure  estimated at 15 mmHg or greater.  No hemodynamically significant valvular abnormalities on 2D or color flow  imaging.    ECG 9/16/2024 1243: Sinus rhythm with diffuse ST elevation, TX elevation in aVR consistent with acute pericarditis.    Lab Results:   Lab Results   Component Value Date    WBC 10.6 09/16/2024    HGB 12.5 09/16/2024    HCT 37.7 09/16/2024     09/16/2024    ALT 14 09/16/2024    AST 16 09/16/2024     09/16/2024    BUN 14.1 09/16/2024    CO2 25 09/16/2024     No results found for: \"CKTOTAL\", \"CKMB\", \"TROPONINI\"        Trip Molina MD FAC  9/17/2024     "

## 2024-09-19 ENCOUNTER — PATIENT OUTREACH (OUTPATIENT)
Dept: CARE COORDINATION | Facility: CLINIC | Age: 69
End: 2024-09-19
Payer: COMMERCIAL

## 2024-09-19 NOTE — PROGRESS NOTES
Clinic Care Coordination Contact  [facility]/Voicemail       Clinical Data: Care Coordinator Outreach  Outreach attempted x 2.  Left message on patient's voicemail with call back information and requested return call.  Plan:  Care Coordinator will do no further outreaches at this time.    Valentina LOMBARDI Community Health Worker  Clinic Care Coordination  [facility]  Phone: [phone]

## 2024-11-01 ENCOUNTER — HOSPITAL ENCOUNTER (INPATIENT)
Facility: CLINIC | Age: 69
LOS: 1 days | Discharge: HOME | DRG: 309 | End: 2024-11-02
Attending: EMERGENCY MEDICINE | Admitting: STUDENT IN AN ORGANIZED HEALTH CARE EDUCATION/TRAINING PROGRAM
Payer: COMMERCIAL

## 2024-11-01 ENCOUNTER — APPOINTMENT (OUTPATIENT)
Dept: RADIOLOGY | Facility: CLINIC | Age: 69
DRG: 309 | End: 2024-11-01
Attending: EMERGENCY MEDICINE
Payer: COMMERCIAL

## 2024-11-01 ENCOUNTER — ANCILLARY PROCEDURE (OUTPATIENT)
Dept: ULTRASOUND IMAGING | Facility: CLINIC | Age: 69
DRG: 309 | End: 2024-11-01
Attending: EMERGENCY MEDICINE
Payer: COMMERCIAL

## 2024-11-01 DIAGNOSIS — I48.91 ATRIAL FIBRILLATION WITH RVR (H): ICD-10-CM

## 2024-11-01 DIAGNOSIS — I10 PRIMARY HYPERTENSION: ICD-10-CM

## 2024-11-01 DIAGNOSIS — I30.0 ACUTE IDIOPATHIC PERICARDITIS: ICD-10-CM

## 2024-11-01 DIAGNOSIS — I48.0 AF (PAROXYSMAL ATRIAL FIBRILLATION) (H): Primary | ICD-10-CM

## 2024-11-01 LAB
ALBUMIN UR-MCNC: 20 MG/DL
ANION GAP SERPL CALCULATED.3IONS-SCNC: 16 MMOL/L (ref 7–15)
APPEARANCE UR: CLEAR
ATRIAL RATE - MUSE: 47 BPM
BACTERIA #/AREA URNS HPF: ABNORMAL /HPF
BASOPHILS # BLD AUTO: 0.1 10E3/UL (ref 0–0.2)
BASOPHILS NFR BLD AUTO: 1 %
BILIRUB UR QL STRIP: NEGATIVE
BUN SERPL-MCNC: 11.6 MG/DL (ref 8–23)
CALCIUM SERPL-MCNC: 9.8 MG/DL (ref 8.8–10.4)
CHLORIDE SERPL-SCNC: 105 MMOL/L (ref 98–107)
COLOR UR AUTO: YELLOW
CREAT SERPL-MCNC: 0.82 MG/DL (ref 0.51–0.95)
CRP SERPL-MCNC: 131 MG/L
DIASTOLIC BLOOD PRESSURE - MUSE: 63 MMHG
EGFRCR SERPLBLD CKD-EPI 2021: 78 ML/MIN/1.73M2
EOSINOPHIL # BLD AUTO: 0.1 10E3/UL (ref 0–0.7)
EOSINOPHIL NFR BLD AUTO: 1 %
ERYTHROCYTE [DISTWIDTH] IN BLOOD BY AUTOMATED COUNT: 12.8 % (ref 10–15)
GLUCOSE SERPL-MCNC: 150 MG/DL (ref 70–99)
GLUCOSE UR STRIP-MCNC: NEGATIVE MG/DL
GRANULAR CAST: 5 /LPF
HCO3 SERPL-SCNC: 22 MMOL/L (ref 22–29)
HCT VFR BLD AUTO: 38.3 % (ref 35–47)
HGB BLD-MCNC: 12.7 G/DL (ref 11.7–15.7)
HGB UR QL STRIP: ABNORMAL
HOLD SPECIMEN: NORMAL
HYALINE CASTS: 4 /LPF
IMM GRANULOCYTES # BLD: 0.1 10E3/UL
IMM GRANULOCYTES NFR BLD: 1 %
INTERPRETATION ECG - MUSE: NORMAL
KETONES UR STRIP-MCNC: NEGATIVE MG/DL
LEUKOCYTE ESTERASE UR QL STRIP: NEGATIVE
LYMPHOCYTES # BLD AUTO: 2 10E3/UL (ref 0.8–5.3)
LYMPHOCYTES NFR BLD AUTO: 22 %
MAGNESIUM SERPL-MCNC: 2 MG/DL (ref 1.7–2.3)
MCH RBC QN AUTO: 28.2 PG (ref 26.5–33)
MCHC RBC AUTO-ENTMCNC: 33.2 G/DL (ref 31.5–36.5)
MCV RBC AUTO: 85 FL (ref 78–100)
MONOCYTES # BLD AUTO: 0.8 10E3/UL (ref 0–1.3)
MONOCYTES NFR BLD AUTO: 9 %
MUCOUS THREADS #/AREA URNS LPF: PRESENT /LPF
NEUTROPHILS # BLD AUTO: 5.9 10E3/UL (ref 1.6–8.3)
NEUTROPHILS NFR BLD AUTO: 67 %
NITRATE UR QL: NEGATIVE
NRBC # BLD AUTO: 0 10E3/UL
NRBC BLD AUTO-RTO: 0 /100
NT-PROBNP SERPL-MCNC: 1161 PG/ML (ref 0–900)
P AXIS - MUSE: 62 DEGREES
PH UR STRIP: 6 [PH] (ref 5–7)
PLATELET # BLD AUTO: 290 10E3/UL (ref 150–450)
POTASSIUM SERPL-SCNC: 3.5 MMOL/L (ref 3.4–5.3)
PR INTERVAL - MUSE: 154 MS
QRS DURATION - MUSE: 70 MS
QT - MUSE: 424 MS
QTC - MUSE: 375 MS
R AXIS - MUSE: 42 DEGREES
RBC # BLD AUTO: 4.5 10E6/UL (ref 3.8–5.2)
RBC URINE: 6 /HPF
SODIUM SERPL-SCNC: 143 MMOL/L (ref 135–145)
SP GR UR STRIP: 1.02 (ref 1–1.03)
SQUAMOUS EPITHELIAL: <1 /HPF
SYSTOLIC BLOOD PRESSURE - MUSE: 122 MMHG
T AXIS - MUSE: 88 DEGREES
TROPONIN T SERPL HS-MCNC: 9 NG/L
TROPONIN T SERPL HS-MCNC: 9 NG/L
TSH SERPL DL<=0.005 MIU/L-ACNC: 3.25 UIU/ML (ref 0.3–4.2)
UROBILINOGEN UR STRIP-MCNC: 3 MG/DL
VENTRICULAR RATE- MUSE: 47 BPM
WBC # BLD AUTO: 8.8 10E3/UL (ref 4–11)
WBC URINE: 3 /HPF

## 2024-11-01 PROCEDURE — 87040 BLOOD CULTURE FOR BACTERIA: CPT | Performed by: EMERGENCY MEDICINE

## 2024-11-01 PROCEDURE — 81001 URINALYSIS AUTO W/SCOPE: CPT | Performed by: HOSPITALIST

## 2024-11-01 PROCEDURE — 84484 ASSAY OF TROPONIN QUANT: CPT | Performed by: EMERGENCY MEDICINE

## 2024-11-01 PROCEDURE — 85025 COMPLETE CBC W/AUTO DIFF WBC: CPT | Performed by: EMERGENCY MEDICINE

## 2024-11-01 PROCEDURE — 93005 ELECTROCARDIOGRAM TRACING: CPT | Performed by: EMERGENCY MEDICINE

## 2024-11-01 PROCEDURE — 80048 BASIC METABOLIC PNL TOTAL CA: CPT | Performed by: EMERGENCY MEDICINE

## 2024-11-01 PROCEDURE — 250N000013 HC RX MED GY IP 250 OP 250 PS 637: Performed by: INTERNAL MEDICINE

## 2024-11-01 PROCEDURE — 84443 ASSAY THYROID STIM HORMONE: CPT | Performed by: HOSPITALIST

## 2024-11-01 PROCEDURE — 99223 1ST HOSP IP/OBS HIGH 75: CPT | Performed by: HOSPITALIST

## 2024-11-01 PROCEDURE — 76604 US EXAM CHEST: CPT

## 2024-11-01 PROCEDURE — 36415 COLL VENOUS BLD VENIPUNCTURE: CPT | Performed by: EMERGENCY MEDICINE

## 2024-11-01 PROCEDURE — 250N000011 HC RX IP 250 OP 636: Performed by: EMERGENCY MEDICINE

## 2024-11-01 PROCEDURE — 120N000004 HC R&B MS OVERFLOW

## 2024-11-01 PROCEDURE — 250N000013 HC RX MED GY IP 250 OP 250 PS 637: Performed by: HOSPITALIST

## 2024-11-01 PROCEDURE — 83880 ASSAY OF NATRIURETIC PEPTIDE: CPT | Performed by: EMERGENCY MEDICINE

## 2024-11-01 PROCEDURE — 96374 THER/PROPH/DIAG INJ IV PUSH: CPT

## 2024-11-01 PROCEDURE — 250N000012 HC RX MED GY IP 250 OP 636 PS 637: Performed by: INTERNAL MEDICINE

## 2024-11-01 PROCEDURE — 99285 EMERGENCY DEPT VISIT HI MDM: CPT | Mod: 25

## 2024-11-01 PROCEDURE — 83735 ASSAY OF MAGNESIUM: CPT | Performed by: HOSPITALIST

## 2024-11-01 PROCEDURE — 86140 C-REACTIVE PROTEIN: CPT | Performed by: EMERGENCY MEDICINE

## 2024-11-01 PROCEDURE — 99222 1ST HOSP IP/OBS MODERATE 55: CPT | Performed by: INTERNAL MEDICINE

## 2024-11-01 PROCEDURE — 71046 X-RAY EXAM CHEST 2 VIEWS: CPT

## 2024-11-01 RX ORDER — METHYLPREDNISOLONE SODIUM SUCCINATE 125 MG/2ML
80 INJECTION INTRAMUSCULAR; INTRAVENOUS ONCE
Status: DISCONTINUED | OUTPATIENT
Start: 2024-11-01 | End: 2024-11-01

## 2024-11-01 RX ORDER — AMIODARONE HYDROCHLORIDE 200 MG/1
400 TABLET ORAL 2 TIMES DAILY
Status: DISCONTINUED | OUTPATIENT
Start: 2024-11-01 | End: 2024-11-02 | Stop reason: HOSPADM

## 2024-11-01 RX ORDER — METOPROLOL TARTRATE 25 MG/1
50 TABLET, FILM COATED ORAL ONCE
Status: DISCONTINUED | OUTPATIENT
Start: 2024-11-01 | End: 2024-11-01

## 2024-11-01 RX ORDER — LIDOCAINE 40 MG/G
CREAM TOPICAL
Status: DISCONTINUED | OUTPATIENT
Start: 2024-11-01 | End: 2024-11-02 | Stop reason: HOSPADM

## 2024-11-01 RX ORDER — PREDNISONE 20 MG/1
20 TABLET ORAL DAILY
Status: DISCONTINUED | OUTPATIENT
Start: 2024-11-11 | End: 2024-11-02 | Stop reason: HOSPADM

## 2024-11-01 RX ORDER — GUAIFENESIN 200 MG/10ML
200 LIQUID ORAL EVERY 4 HOURS PRN
Status: DISCONTINUED | OUTPATIENT
Start: 2024-11-01 | End: 2024-11-02 | Stop reason: HOSPADM

## 2024-11-01 RX ORDER — AMIODARONE HYDROCHLORIDE 200 MG/1
200 TABLET ORAL 2 TIMES DAILY
Status: DISCONTINUED | OUTPATIENT
Start: 2024-11-05 | End: 2024-11-02 | Stop reason: HOSPADM

## 2024-11-01 RX ORDER — AMOXICILLIN 250 MG
2 CAPSULE ORAL 2 TIMES DAILY PRN
Status: DISCONTINUED | OUTPATIENT
Start: 2024-11-01 | End: 2024-11-02 | Stop reason: HOSPADM

## 2024-11-01 RX ORDER — METOPROLOL SUCCINATE 25 MG/1
25 TABLET, EXTENDED RELEASE ORAL DAILY
Status: DISCONTINUED | OUTPATIENT
Start: 2024-11-01 | End: 2024-11-02 | Stop reason: HOSPADM

## 2024-11-01 RX ORDER — METHYLPREDNISOLONE SODIUM SUCCINATE 40 MG/ML
40 INJECTION INTRAMUSCULAR; INTRAVENOUS 2 TIMES DAILY
Status: DISCONTINUED | OUTPATIENT
Start: 2024-11-01 | End: 2024-11-01

## 2024-11-01 RX ORDER — ONDANSETRON 4 MG/1
4 TABLET, ORALLY DISINTEGRATING ORAL EVERY 6 HOURS PRN
Status: DISCONTINUED | OUTPATIENT
Start: 2024-11-01 | End: 2024-11-02 | Stop reason: HOSPADM

## 2024-11-01 RX ORDER — PREDNISONE 5 MG/1
10 TABLET ORAL DAILY
Status: DISCONTINUED | OUTPATIENT
Start: 2024-11-21 | End: 2024-11-02 | Stop reason: HOSPADM

## 2024-11-01 RX ORDER — METOPROLOL TARTRATE 1 MG/ML
5 INJECTION, SOLUTION INTRAVENOUS EVERY 5 MIN PRN
Status: DISCONTINUED | OUTPATIENT
Start: 2024-11-01 | End: 2024-11-01

## 2024-11-01 RX ORDER — PREDNISONE 20 MG/1
40 TABLET ORAL DAILY
Status: DISCONTINUED | OUTPATIENT
Start: 2024-11-01 | End: 2024-11-02 | Stop reason: HOSPADM

## 2024-11-01 RX ORDER — COLCHICINE 0.6 MG/1
0.6 TABLET ORAL 2 TIMES DAILY
Status: DISCONTINUED | OUTPATIENT
Start: 2024-11-01 | End: 2024-11-01

## 2024-11-01 RX ORDER — AMOXICILLIN 250 MG
1 CAPSULE ORAL 2 TIMES DAILY PRN
Status: DISCONTINUED | OUTPATIENT
Start: 2024-11-01 | End: 2024-11-02 | Stop reason: HOSPADM

## 2024-11-01 RX ORDER — PANTOPRAZOLE SODIUM 40 MG/1
40 TABLET, DELAYED RELEASE ORAL DAILY
Status: DISCONTINUED | OUTPATIENT
Start: 2024-11-02 | End: 2024-11-02 | Stop reason: HOSPADM

## 2024-11-01 RX ORDER — PREDNISONE 5 MG/1
5 TABLET ORAL DAILY
Status: DISCONTINUED | OUTPATIENT
Start: 2024-11-26 | End: 2024-11-02 | Stop reason: HOSPADM

## 2024-11-01 RX ORDER — AMIODARONE HYDROCHLORIDE 200 MG/1
200 TABLET ORAL DAILY
Status: DISCONTINUED | OUTPATIENT
Start: 2024-11-08 | End: 2024-11-02 | Stop reason: HOSPADM

## 2024-11-01 RX ORDER — ONDANSETRON 2 MG/ML
4 INJECTION INTRAMUSCULAR; INTRAVENOUS EVERY 6 HOURS PRN
Status: DISCONTINUED | OUTPATIENT
Start: 2024-11-01 | End: 2024-11-02 | Stop reason: HOSPADM

## 2024-11-01 RX ORDER — ROSUVASTATIN CALCIUM 10 MG/1
10 TABLET, COATED ORAL EVERY EVENING
Status: DISCONTINUED | OUTPATIENT
Start: 2024-11-01 | End: 2024-11-02 | Stop reason: HOSPADM

## 2024-11-01 RX ORDER — COLCHICINE 0.6 MG
0.3 TABLET ORAL DAILY
Status: DISCONTINUED | OUTPATIENT
Start: 2024-11-02 | End: 2024-11-02 | Stop reason: HOSPADM

## 2024-11-01 RX ORDER — CALCIUM CARBONATE 500 MG/1
1000 TABLET, CHEWABLE ORAL 4 TIMES DAILY PRN
Status: DISCONTINUED | OUTPATIENT
Start: 2024-11-01 | End: 2024-11-02 | Stop reason: HOSPADM

## 2024-11-01 RX ORDER — PREDNISONE 5 MG/1
15 TABLET ORAL DAILY
Status: DISCONTINUED | OUTPATIENT
Start: 2024-11-16 | End: 2024-11-02 | Stop reason: HOSPADM

## 2024-11-01 RX ORDER — LISINOPRIL 20 MG/1
20 TABLET ORAL DAILY
Status: DISCONTINUED | OUTPATIENT
Start: 2024-11-02 | End: 2024-11-02 | Stop reason: HOSPADM

## 2024-11-01 RX ADMIN — APIXABAN 5 MG: 5 TABLET, FILM COATED ORAL at 18:03

## 2024-11-01 RX ADMIN — METOPROLOL SUCCINATE 25 MG: 25 TABLET, EXTENDED RELEASE ORAL at 17:00

## 2024-11-01 RX ADMIN — AMIODARONE HYDROCHLORIDE 400 MG: 200 TABLET ORAL at 18:35

## 2024-11-01 RX ADMIN — ROSUVASTATIN CALCIUM 10 MG: 10 TABLET, FILM COATED ORAL at 20:08

## 2024-11-01 RX ADMIN — METHYLPREDNISOLONE SODIUM SUCCINATE 81.25 MG: 125 INJECTION, POWDER, FOR SOLUTION INTRAMUSCULAR; INTRAVENOUS at 12:13

## 2024-11-01 RX ADMIN — PREDNISONE 40 MG: 20 TABLET ORAL at 18:03

## 2024-11-01 ASSESSMENT — ACTIVITIES OF DAILY LIVING (ADL)
ADLS_ACUITY_SCORE: 0

## 2024-11-01 ASSESSMENT — COLUMBIA-SUICIDE SEVERITY RATING SCALE - C-SSRS
1. IN THE PAST MONTH, HAVE YOU WISHED YOU WERE DEAD OR WISHED YOU COULD GO TO SLEEP AND NOT WAKE UP?: NO
6. HAVE YOU EVER DONE ANYTHING, STARTED TO DO ANYTHING, OR PREPARED TO DO ANYTHING TO END YOUR LIFE?: NO
2. HAVE YOU ACTUALLY HAD ANY THOUGHTS OF KILLING YOURSELF IN THE PAST MONTH?: NO

## 2024-11-01 NOTE — PHARMACY-ADMISSION MEDICATION HISTORY
Pharmacist Admission Medication History    Admission medication history is complete. The information provided in this note is only as accurate as the sources available at the time of the update.    Information Source(s): Patient and CareEverywhere/SureScripts via in-person    Pertinent Information: N/A    Changes made to PTA medication list:  Added: None  Deleted: pantoprazole  Changed: lisinopril    Allergies reviewed with patient and updates made in EHR: yes    Medication History Completed By: Edinson Aquino RPH 11/1/2024 1:00 PM    PTA Med List   Medication Sig Last Dose/Taking    colchicine (COLCRYS) 0.6 MG tablet Take 1 tablet (0.6 mg) by mouth 2 times daily. 10/31/2024 Evening    lisinopril (ZESTRIL) 20 MG tablet Take 20 mg by mouth daily. 10/31/2024 Evening    metoprolol succinate ER (TOPROL XL) 25 MG 24 hr tablet Take 1 tablet (25 mg) by mouth daily. 10/31/2024 Morning    omeprazole (PRILOSEC) 20 MG DR capsule Take 20 mg by mouth daily. 11/1/2024 Morning    rosuvastatin (CRESTOR) 10 MG tablet Take 10 mg by mouth daily. 10/31/2024 Evening    triamcinolone (KENALOG) 0.1 % external cream Apply topically 3 times daily as needed for irritation. Past Month

## 2024-11-01 NOTE — ED NOTES
POC US ECHO LIMITED    Date/Time: 11/1/2024 10:56 AM    Performed by: Zac Rutledge MD  Authorized by: Zac Rutledge MD    Comments:      Indications: Chest pain, history of pericarditis  Views obtained: Parasternal long, parasternal short, apical four-chamber, subxiphoid, bilateral thoracic lung views  Findings: Heart rate is tachycardic and rhythm is irregular.  Systolic performance is grossly normal.  No  appearance of RV enlargement.  No pericardial effusion.  Lung windows clear without significant b-lines.    Impression: No pericardial effusion, systolic performance is grossly normal.  Rate is tachycardic and rhythm is irregular.    Images are being saved and should be available for review in PACS.         Zac Rutledge MD  11/01/24 1058

## 2024-11-01 NOTE — CONSULTS
[facility] HE[facility] [address], [place] [address]   [email or web address]   OFFICE: [phone]     CARDIOLOGY INPATIENT CONSULT NOTE     Impression and Plan     Assessment:  Recurrent acute idiopathic pericarditis - failing therapy with ibuprofen and colchicine. Due to failure of therapy of NSAIDs will utilized a 30-day prednisone taper.  Paroxysmal atrial fibrillation with RVR and tachy-zak physiology. Not currently on anticoagulation. CHADS2-vasc score of 3  Hypertension - BP is elevated    Plan:  Start prednisone 40 mg x5 days, then taper every 5 days for next month (ordered)  Eliquis 5 mg BID for stroke prevention  Continue colchicine  Start Amiodarone for rhythm control.   Okay to continue metoprolol for now, consider discontinuing this if her HR is slow.  Would no longer use ibuprofen due to starting OAC.  Will need close follow-up with primary cardiologist.    Primary Cardiologist: Dr. Trevizo ([facility])    History of Present Illness      Ms. Carri Hauser is a 68 year old female with recurrent subacute pericarditis, hypertension, hyperlipidemia, PAF s/p PVI (2016), who presented with worsening chest pain.     was recently hospitalized for acute pericarditis on 9/16. She has been taking colchicine and ibuprofen. She has recently developed worsening of her chest pain that is worse when laying down and with increased heart rate. Improved with sitting forward. CRP is also increased from prior. She was given a dose of solu-medrol in the ER with improvement in her pain.    Other than noted above, Ms. Hauser denies any chest pain/pressure/tightness, shortness of breath at rest or with exertion, light headedness/dizziness, pre-syncope, syncope, lower extremity swelling, palpitations, paroxysmal nocturnal dyspnea (PND), or orthopnea.    Review of Systems:  Further review of systems is otherwise negative/noncontributory (based on review of medical record  (admission H&P) and 13 point review of systems reviewed. Pertinent positives noted).    Cardiac Diagnostics     ECG: Personally reviewed and interpreted: sinus rhythm with sinus arrhythmia and Qtc 448, also afib with RVR and  bpm, sinus bradycardia with HR 47 bpm    Telemetry (personally reviewed): afib with RVR    Most recent:  Echocardiogram (results reviewed):   TTE 9/16/24  Left ventricular size, wall motion and function are normal. The ejection  fraction is 60-65%.  Normal right ventricle size and systolic function.  There is no pericardial effusion.  Aortic root and ascending dilatation is present.  IVC diameter >2.1 cm collapsing <50% with sniff suggests a high RA pressure  estimated at 15 mmHg or greater.  No hemodynamically significant valvular abnormalities on 2D or color flow  imaging.    Cardiac MRI 10/24/24  -Cardiac MRI findings consistent with subacute pericarditis with no evidence of myocarditis. There is mild thickening of the pericardium 3-4 mm over the right ventricle and anterior left ventricle with associated late gadolinium enhancement.  On T2 weighted imaging, there is mild hyperintensity of the pericardium overlying the basal right ventricle consistent with edema/inflammation.  There is prominent epicardial fat.    There is no pericardial free fluid. There is no evidence of pericardial constriction.   -The left ventricle is normal in size with normal systolic function. Quantitative LVEF 65 %.  There are no   regional wall motion abnormalities.   -The right ventricle is normal in size with normal systolic function. Quantitative RVEF 65 %.   -There is no replacement fibrosis or prior myocardial infarction.   -The left atrium is moderately dilated.   -There is no significant valvular heart disease.         Medical History  Surgical History Family History Social History   No past medical history on file.  No past surgical history on file.  No family history on file.        Social History      Socioeconomic History    Marital status:      Spouse name: Not on file    Number of children: Not on file    Years of education: Not on file    Highest education level: Not on file   Occupational History    Not on file   Tobacco Use    Smoking status: Not on file    Smokeless tobacco: Not on file   Substance and Sexual Activity    Alcohol use: Not on file    Drug use: Not on file    Sexual activity: Not on file   Other Topics Concern    Not on file   Social History Narrative    Not on file     Social Drivers of Health     Financial Resource Strain: Low Risk  (9/19/2024)    Received from [facility]    Financial Resource Strain     Difficulty of Paying Living Expenses: 3     Difficulty of Paying Living Expenses: Not on file   Food Insecurity: No Food Insecurity (9/19/2024)    Received from [facility]    Food Insecurity     Do you worry your food will run out before you are able to buy more?: 1   Transportation Needs: No Transportation Needs (9/19/2024)    [facility]    Transportation Needs     Does lack of transportation keep you from medical appointments?: 1     Does lack of transportation keep you from work, meetings or getting things that you need?: 1   Physical Activity: Not on file   Stress: Not on file   Social Connections: Socially Integrated (9/19/2024)    Received from [facility]    Social Connections     Do you often feel lonely or isolated from those around you?: 0   Interpersonal Safety: Low Risk  (9/16/2024)    Interpersonal Safety     Do you feel physically and emotionally safe where you currently live?: Yes     Within the past 12 months, have you been hit, slapped, kicked or otherwise physically hurt by someone?: No     Within the past 12 months, have you been humiliated or emotionally abused in other ways by your partner or ex-partner?: No  "  Housing Stability: Low Risk  (9/19/2024)    Received from [facility]    Housing Stability     What is your housing situation today?: 1             Physical Examination   VITALS: BP (!) 165/90 (BP Location: Right arm)   Pulse 97   Temp 97.8  F (36.6  C) (Oral)   Resp 22   Ht 1.727 m (5' 8\")   Wt 95.4 kg (210 lb 6.4 oz)   SpO2 96%   BMI 31.99 kg/m    BMI: Body mass index is 31.99 kg/m .  Wt Readings from Last 3 Encounters:   11/01/24 95.4 kg (210 lb 6.4 oz)   09/16/24 97.9 kg (215 lb 14.4 oz)   08/24/23 95.7 kg (211 lb)     No intake or output data in the 24 hours ending 11/01/24 1619    General: pleasant female. No acute distress.   HENT: external ears normal. Nares patent. Mucous membranes moist.  Eyes: perrla, extraocular muscles intact. No scleral icterus.   Neck: No JVD  Lungs: clear to auscultation  COR: fast rate, irregular rhythm., No murmurs, rubs, or gallops  Abd: nondistended, BS present  Extrem: No edema         Non-cardiac Imaging Studies Reviewed      Chest x-ray: IMPRESSION: Slight hyperinflation. Heart size upper limits of normal. Chest otherwise negative. No acute findings.        Lab Results Reviewed    Chemistry/lipid CBC Cardiac Enzymes/BNP/TSH/INR   No results for input(s): \"CHOL\", \"HDL\", \"LDL\", \"TRIG\", \"CHOLHDLRATIO\" in the last 36456 hours.  No results for input(s): \"LDL\" in the last 31242 hours.  Recent Labs   Lab Test 11/01/24  1033      POTASSIUM 3.5   CHLORIDE 105   CO2 22   *   BUN 11.6   CR 0.82   GFRESTIMATED 78   SHRUTHI 9.8     Recent Labs   Lab Test 11/01/24  1033 09/16/24  0432 09/16/24  0408   CR 0.82 0.8 0.79     No results for input(s): \"A1C\" in the last 05184 hours.       Recent Labs   Lab Test 11/01/24  1033   WBC 8.8   HGB 12.7   HCT 38.3   MCV 85        Recent Labs   Lab Test 11/01/24  1033 09/16/24  0408 08/24/23  1034   HGB 12.7 12.5 13.5    No results for input(s): \"TROPONINI\" in the last 34479 hours.  Recent Labs " "  Lab Test 11/01/24  1033 09/16/24  0408   NTBNPI 1,161* 152     Recent Labs   Lab Test 11/01/24  1238   TSH 3.25     No results for input(s): \"INR\" in the last 91789 hours.        Current Inpatient Scheduled Medications   Scheduled Meds:  Current Facility-Administered Medications   Medication Dose Route Frequency Provider Last Rate Last Admin    colchicine (COLCRYS) tablet 0.6 mg  0.6 mg Oral BID Scott Mendenhall MD        [START ON 11/2/2024] lisinopril (ZESTRIL) tablet 20 mg  20 mg Oral Daily Scott Mendenhall MD        methylPREDNISolone sodium succinate (SOLU-MEDROL) injection 40 mg  40 mg Intravenous BID Scott Mendenhall MD        metoprolol succinate ER (TOPROL XL) 24 hr tablet 25 mg  25 mg Oral Daily Scott Mendenhall MD        [START ON 11/2/2024] pantoprazole (PROTONIX) EC tablet 40 mg  40 mg Oral Daily Scott Mendenhall MD        rosuvastatin (CRESTOR) tablet 10 mg  10 mg Oral QPM Scott Mendenhall MD        sodium chloride (PF) 0.9% PF flush 3 mL  3 mL Intracatheter Q8H Scott Mendenhall MD   3 mL at 11/01/24 1319     Continuous Infusions:  Current Facility-Administered Medications   Medication Dose Route Frequency Provider Last Rate Last Admin       No current outpatient medications on file.          Medications Prior to Admission   Prior to Admission medications    Medication Sig Start Date End Date Taking? Authorizing Provider   colchicine (COLCRYS) 0.6 MG tablet Take 1 tablet (0.6 mg) by mouth 2 times daily. 9/17/24  Yes Elgin Trotter DO   lisinopril (ZESTRIL) 20 MG tablet Take 20 mg by mouth daily.   Yes Unknown, Entered By History   metoprolol succinate ER (TOPROL XL) 25 MG 24 hr tablet Take 1 tablet (25 mg) by mouth daily. 9/17/24  Yes Elgin Trotter DO   omeprazole (PRILOSEC) 20 MG DR capsule Take 20 mg by mouth daily.   Yes Unknown, Entered By History   rosuvastatin (CRESTOR) 10 MG tablet Take 10 mg by mouth daily.   Yes Unknown, Entered By History   triamcinolone (KENALOG) " "0.1 % external cream Apply topically 3 times daily as needed for irritation.   Yes Unknown, Entered By History              Clinically Significant Risk Factors Present on Admission                   # Hypertension: Noted on problem list         # Obesity: Estimated body mass index is 31.99 kg/m  as calculated from the following:    Height as of this encounter: 1.727 m (5' 8\").    Weight as of this encounter: 95.4 kg (210 lb 6.4 oz).                 "

## 2024-11-01 NOTE — ED NOTES
Pt in NSR, sinus dysrhythmia HR 45-90. Pt went into AF RVR heart rate  for a few minutes and spontaneously converted back to NSR HR 80. Metoprolol was discontinued.

## 2024-11-01 NOTE — ED PROVIDER NOTES
EMERGENCY DEPARTMENT ENCOUNTER      NAME: Carri Hauser  AGE: 68 year old female  YOB: 1955  MRN: [id number]  EVALUATION DATE & TIME: 2024 10:24 AM    PCP: Ruba Mendez    ED PROVIDER: Zac Rutledge M.D.      Chief Complaint   Patient presents with    Shortness of Breath         FINAL IMPRESSION:  1. Atrial fibrillation with RVR (H)    2. Acute idiopathic pericarditis          ED COURSE & MEDICAL DECISION MAKIN year old female presents to the Emergency Department for evaluation of chest pain.  Patient has a history of subacute pericarditis, being managed primarily through an [facility] cardiologist, admitted here in September.  She is on colchicine and ibuprofen.  Despite this is having increasing upper chest discomfort and intermittent palpitations.  She has a remote history of atrial fibrillation with RVR status post ablation many years ago.  She arrives to the emergency department with intermittent tachycardia.  For the most part she is in sinus rhythm but occasionally will have runs of atrial fibrillation lasting a few minutes with heart rates up into the 150s to 160s.  I did a bedside echocardiogram which showed no pericardial effusion and grossly preserved systolic performance.  Chest x-ray appearing clear at this time.  BNP is minimally elevated.  High-sensitivity troponin within normal limits.  Of note the patient CRP is significantly elevated to 131 from 6 during her most recent hospitalization.  Case discussed with cardiology Dr Herrmann.  Unfortunately the patient is having intermittent periods of bradycardia to the 40s which make me more reluctant to administer AV roberto blocking agents to help treat her atrial fibrillation. They recommended initiating her on IV steroids for now since she is failing NSAID and colchicine therapy.  Hopefully this will help settle down inflammation and potentially improve her dysrhythmia as well.  Recommended admission to the hospital for  monitoring.  Discussed case with hospitalist Dr. Rowley.    At the conclusion of the encounter I discussed the results of all of the tests and the disposition. The questions were answered. The patient or family acknowledged understanding and was agreeable with the care plan.       Medical Decision Making  Obtained supplemental history:Supplemental history obtained?: No  Reviewed external records: External records reviewed?: Documented in chart and Inpatient Record: Admission in September for pericarditis  Care impacted by chronic illness:Documented in Chart  Did you consider but not order tests?: Work up considered but not performed and documented in chart, if applicable  Did you interpret images independently?: Independent interpretation of ECG and images noted in documentation, when applicable.  Consultation discussion with other provider:Did you involve another provider (consultant, , pharmacy, etc.)?: I discussed the care with another health care provider, see documentation for details.  Admit.    MIPS: Not Applicable          MEDICATIONS GIVEN IN THE EMERGENCY:  Medications   methylPREDNISolone Na Suc (solu-MEDROL) injection 81.25 mg (81.25 mg Intravenous $Given 11/1/24 1213)       NEW PRESCRIPTIONS STARTED AT TODAY'S ER VISIT  New Prescriptions    No medications on file          =================================================================    HPI    Patient information was obtained from: Patient        Carri Hauser is a 68 year old female with a pertinent history of pericarditis who presents to this ED today for evaluation of chest pain.  Patient has about 24 hours of increasing pain in her upper chest which is intermittent, associated primarily with deep breathing and position changes.  She says this feels similar to pericarditis pain but is worse.  She is currently on colchicine and ibuprofen at the direction of an outpatient cardiology clinic.  She feels little more short of breath during this time as  well.  She denies increase in leg swelling.  Denies fever.  Denies cough.      REVIEW OF SYSTEMS   All systems reviewed and negative except as noted in HPI.    PAST MEDICAL HISTORY:  No past medical history on file.    PAST SURGICAL HISTORY:  No past surgical history on file.        CURRENT MEDICATIONS:    No current facility-administered medications for this encounter.     Current Outpatient Medications   Medication Sig Dispense Refill    colchicine (COLCRYS) 0.6 MG tablet Take 1 tablet (0.6 mg) by mouth 2 times daily. 60 tablet 2    lisinopril (ZESTRIL) 20 MG tablet Take 20 mg by mouth daily.      metoprolol succinate ER (TOPROL XL) 25 MG 24 hr tablet Take 1 tablet (25 mg) by mouth daily. 30 tablet 1    omeprazole (PRILOSEC) 20 MG DR capsule Take 20 mg by mouth daily.      rosuvastatin (CRESTOR) 10 MG tablet Take 10 mg by mouth daily.      triamcinolone (KENALOG) 0.1 % external cream Apply topically 3 times daily as needed for irritation.           ALLERGIES:  Allergies   Allergen Reactions    Pseudoephedrine Other (See Comments)     Other Reaction(s): Tachycardia    Tetracycline Hives    Adhesive Tape Rash     Echocardiogram stickers    Tylenol [Acetaminophen] Rash     All over chest       FAMILY HISTORY:  No family history on file.    SOCIAL HISTORY:   Social History     Socioeconomic History    Marital status:      Social Drivers of Health     Financial Resource Strain: Low Risk  (9/19/2024)    Received from [facility]    Financial Resource Strain     Difficulty of Paying Living Expenses: 3   Food Insecurity: No Food Insecurity (9/19/2024)    Received from [facility]    Food Insecurity     Do you worry your food will run out before you are able to buy more?: 1   Transportation Needs: No Transportation Needs (9/19/2024)    Received from [facility]    Transportation Needs     Does lack of transportation  "keep you from medical appointments?: 1     Does lack of transportation keep you from work, meetings or getting things that you need?: 1   Social Connections: Socially Integrated (9/19/2024)    Received from [facility]    Social Connections     Do you often feel lonely or isolated from those around you?: 0   Interpersonal Safety: Low Risk  (9/16/2024)    Interpersonal Safety     Do you feel physically and emotionally safe where you currently live?: Yes     Within the past 12 months, have you been hit, slapped, kicked or otherwise physically hurt by someone?: No     Within the past 12 months, have you been humiliated or emotionally abused in other ways by your partner or ex-partner?: No   Housing Stability: Low Risk  (9/19/2024)    Received from [facility]    Housing Stability     What is your housing situation today?: 1       VITALS:  /63   Pulse 83   Temp 97.9  F (36.6  C) (Oral)   Resp 16   Ht 1.727 m (5' 8\")   Wt 93.4 kg (206 lb)   SpO2 97%   BMI 31.32 kg/m      PHYSICAL EXAM    Constitutional: Well developed, Well nourished, NAD.  HENT: Normocephalic, Atraumatic. Neck Supple.  Eyes: EOMI, Conjunctiva normal.  Respiratory: Breathing comfortably on room air. Speaks full sentences easily. Lungs clear to ascultation.  Cardiovascular: Tachycardic heart rate, Irregular rhythm. No peripheral edema.  Abdomen: Soft, nontender  Musculoskeletal: Good range of motion in all major joints. No major deformities noted.  Integument: Warm, Dry.  Neurologic: Alert & awake, Normal motor function, Normal sensory function, No focal deficits noted.   Psychiatric: Cooperative. Affect appropriate.     LAB:  All pertinent labs reviewed and interpreted.  Labs Ordered and Resulted from Time of ED Arrival to Time of ED Departure   BASIC METABOLIC PANEL - Abnormal       Result Value    Sodium 143      Potassium 3.5      Chloride 105      Carbon Dioxide (CO2) 22   "    Anion Gap 16 (*)     Urea Nitrogen 11.6      Creatinine 0.82      GFR Estimate 78      Calcium 9.8      Glucose 150 (*)    NT PROBNP INPATIENT - Abnormal    N terminal Pro BNP Inpatient 1,161 (*)    CRP INFLAMMATION - Abnormal    CRP Inflammation 131.00 (*)    TROPONIN T, HIGH SENSITIVITY - Normal    Troponin T, High Sensitivity 9     TROPONIN T, HIGH SENSITIVITY - Normal    Troponin T, High Sensitivity 9     CBC WITH PLATELETS AND DIFFERENTIAL    WBC Count 8.8      RBC Count 4.50      Hemoglobin 12.7      Hematocrit 38.3      MCV 85      MCH 28.2      MCHC 33.2      RDW 12.8      Platelet Count 290      % Neutrophils 67      % Lymphocytes 22      % Monocytes 9      % Eosinophils 1      % Basophils 1      % Immature Granulocytes 1      NRBCs per 100 WBC 0      Absolute Neutrophils 5.9      Absolute Lymphocytes 2.0      Absolute Monocytes 0.8      Absolute Eosinophils 0.1      Absolute Basophils 0.1      Absolute Immature Granulocytes 0.1      Absolute NRBCs 0.0     BLOOD CULTURE       RADIOLOGY:  Reviewed all pertinent imaging. Please see official radiology report.  Chest XR,  PA & LAT   Final Result   IMPRESSION: Slight hyperinflation. Heart size upper limits of normal. Chest otherwise negative. No acute findings.      POC US ECHO LIMITED   Final Result          EKG:    Performed at: 1027    Impression: Sinus rhythm with sinus arrhythmia, low voltage QRS    Rate: 66  Rhythm: Sinus  Axis: Normal  WI Interval: 150  QRS Interval: 66  QTc Interval: 448  ST Changes: Nonspecific ST-T wave abnormality  Comparison: Compared to September 16, 2024, there is improvement in the ST change in the anterior and inferior leads    I have independently reviewed and interpreted the EKG(s) documented above.    Performed at: 1051    Impression: Atrial fibrillation with RVR, low voltage QRS, nonspecific ST-T wave abnormality    Rate: 136  Rhythm: Afib  Axis: Normal  WI Interval: NA  QRS Interval: 72  QTc Interval: 394  ST Changes:  Nonspecific ST abnormality  Comparison: Compared to earlier today, atrial fibrillation has replaced sinus rhythm    I have independently reviewed and interpreted the EKG(s) documented above.          Zac Rutledge M.D.  Emergency Medicine  [facility] EMERGENCY ROOM  [facility] [id number] [phone]  Dept: [phone]       Zac Rutledge MD  11/01/24 2622

## 2024-11-01 NOTE — ED NOTES
Pt returned from CXR and -150, AF RVR. Pt reports mild shoulder pain 4/10 and palpitations. Metoprolol has been discontinued. Notified Dr Rutledge. Plan to monitor and hold off on meds per Dr Rutledge.

## 2024-11-01 NOTE — H&P
Hospital Medicine Service History and Physical  [facility]: [facility]    Carri Hauser is a 68 year old femalewith  PMHx: Paroxysmal atrial fibrillation status post PVI ablation (2016), CAD, hypothyroidism, obesity, HTN, HLD, hepatic steatosis  Recent hospitalization [facility] 9/16 - 9/17 diagnosed idiopathic pericarditis, given ibuprofen and colchicine  Chief Complaint: Shortness of breath, palpitations    Hospital Course   11/01/24 - She presented to [facility] ED with increasing shortness of breath, palpitations, and bilateral shoulder pain over the past week. She'd been managed outpatient by [facility] cardiology for subacute pericarditis.  A week before presentation cardiac MRI was negative for myocarditis and showed biventricular ejection fraction 65%.  On arrival she was hemodynamically unstable with heart rates undulating from 40 bpm to 160.  Workup revealed CRP elevation (131), elevated anion gap without acidosis (16), elevated BNP (1100), and normal hematology.  EKGs captured irregular rhythms including A-fib RVR.  Chest x-ray showed no acuity.  ED discussed with Cardiology who recommended she receive IV methylprednisolone initially to attempt rate control  Assessment & Plan   Recurrent pericarditis: Etiology unclear.  If original pericarditis was autoimmune, recent antibiotics could conceivably increased risk of recurrence.  Has rosacea and questionable thyroid history, otherwise no obvious autoimmune disease.  No family history of lupus, polymyositis, Sjogren syndrome, or rheumatoid arthritis.  -cardiac tele  -Cardiology consult  -Continue IV methylprednisolone 40 mg twice daily for 4 doses, defer adjustment to cardiology  -PTA colchicine 0.6 mg twice daily  -PTA omeprazole 20 mg daily    Atrial fibrillation with rapid ventricular response  -Cardiac telemetry with cardiology consult as above  -Check magnesium  -Check TSH  -PTA metoprolol succinate 25 mg daily  -Defer anticoagulant discussion  "to cardiology    Essential hypertension  -PTA lisinopril 20 mg daily    Elevated CRP: Colchicine can increase susceptibility for certain infections  -Recommended ED obtain blood culture  -AM CRP    Elevated anion gap without metabolic acidosis: Does not appear intoxicated, denies salicylates or acetaminophen  -check UA  -A.m. BMP    Hyperlipidemia  -PTA rosuvastatin 10 mg daily    Rosacea       Estimated body mass index is 31.32 kg/m  as calculated from the following:    Height as of this encounter: 1.727 m (5' 8\").    Weight as of this encounter: 93.4 kg (206 lb).  DVTP: mechanical  Code Status: Prior  Disposition: Inpatient   Discharge: Pending CRP trend  Medically Ready for Discharge: Anticipated in 2-4 Days    History of Present Illness  Carri Hauser says she has been following the medication she was discharged with.  She noticed leaning forward would make symptoms worse. A few weeks ago she was feeling much improved, able to take deep breaths, and go for walks.  Over the past week she was experiencing pleuritic chest pain radiating to the shoulders as well as occasional palpitations.  Last night her symptoms worsened and her pain worsened.  Palpitations kept her from sleeping.  Was unable to get into cardiology clinic this morning.  NP at clinic referred her to urgent care, who then sent her to the ER.  She was on 600 mg ibuprofen 3 times a day which she had weaned off and was taking ibuprofen as needed.  She has remained on twice daily colchicine since being discharged from [facility] originally.  She is also been taking omeprazole for GI protection.  She takes baby aspirin every morning, and does not take Tylenol because it gives her hives.  2-1/2 weeks ago was amoxicillin which she finished for a sinus/ear infection.  Interestingly, cardiology told her they suspected her previous ER presentations may have been mild pericarditis undiagnosed. Was previously diagnosed with hypothyroidism, not currently taking " "medication \" I had some nodules and blood tests, had some biopsies\".    Denies nausea vomiting or diarrhea, has been having more constipation.  Denies focal weakness/numbness  Denies any new urinary complaints    She worked in the food industry for a long time, [employer], mostly in research and development  Denies drug use or tobacco use, she is a social drinker.  She actually feels improvement since receiving the steroid.    \"When I increased lisinopril to 20 mg, I had a stabbing headache for weeks, temple to temple. Now the HA is background noise\"   Says the headaches are non-pulsatile, and occur 2 - 3 hrs after taking lisinopril.  Says it happens reliably.  Upon waking the headaches are gone.    ED triage note:  Pt was diagnosed with pericarditis in September.  Was improving on medications until this past week when she noticed increasing shortness of breath, bilateral shoulder pain and palpitations.      Results for orders placed or performed during the hospital encounter of 11/01/24   Chest XR,  PA & LAT    Impression    IMPRESSION: Slight hyperinflation. Heart size upper limits of normal. Chest otherwise negative. No acute findings.      Medications   methylPREDNISolone Na Suc (solu-MEDROL) injection 81.25 mg (81.25 mg Intravenous $Given 11/1/24 1213)     Physical exam:  Appears NAD in the bed  Anicteric sclera, wearing glasses  Erythema bilateral cheeks consistent with rosacea  No JVD  Lungs clear to auscultation  Breathing comfortably on room air  Rate regular in the 80s (spontaneously converted to sinus rhythm), no friction rub audible  Nonacute abdomen  Normal affect, alert  Vital signs reviewed by me    Wt Readings from Last 4 Encounters:   11/01/24 93.4 kg (206 lb)   09/16/24 97.9 kg (215 lb 14.4 oz)   08/24/23 95.7 kg (211 lb)        family history is not on file.   has no past surgical history on file.   Allergies   Allergen Reactions    Pseudoephedrine Other (See Comments)     Other " Reaction(s): Tachycardia    Tetracycline Hives    Adhesive Tape Rash     Echocardiogram stickers    Tylenol [Acetaminophen] Rash     All over chest       Scott Mendenhall MD, MPH  [facility]   Phone: #[phone]

## 2024-11-02 VITALS
BODY MASS INDEX: 31.11 KG/M2 | OXYGEN SATURATION: 98 % | TEMPERATURE: 97.5 F | RESPIRATION RATE: 22 BRPM | HEART RATE: 69 BPM | SYSTOLIC BLOOD PRESSURE: 185 MMHG | HEIGHT: 68 IN | WEIGHT: 205.3 LBS | DIASTOLIC BLOOD PRESSURE: 94 MMHG

## 2024-11-02 LAB
ANION GAP SERPL CALCULATED.3IONS-SCNC: 15 MMOL/L (ref 7–15)
BUN SERPL-MCNC: 14.7 MG/DL (ref 8–23)
CALCIUM SERPL-MCNC: 9.4 MG/DL (ref 8.8–10.4)
CHLORIDE SERPL-SCNC: 104 MMOL/L (ref 98–107)
CREAT SERPL-MCNC: 0.66 MG/DL (ref 0.51–0.95)
CRP SERPL-MCNC: 119 MG/L
EGFRCR SERPLBLD CKD-EPI 2021: >90 ML/MIN/1.73M2
GLUCOSE SERPL-MCNC: 200 MG/DL (ref 70–99)
HCO3 SERPL-SCNC: 20 MMOL/L (ref 22–29)
HOLD SPECIMEN: NORMAL
POTASSIUM SERPL-SCNC: 3.7 MMOL/L (ref 3.4–5.3)
SODIUM SERPL-SCNC: 139 MMOL/L (ref 135–145)

## 2024-11-02 PROCEDURE — 250N000013 HC RX MED GY IP 250 OP 250 PS 637: Performed by: INTERNAL MEDICINE

## 2024-11-02 PROCEDURE — 80048 BASIC METABOLIC PNL TOTAL CA: CPT | Performed by: HOSPITALIST

## 2024-11-02 PROCEDURE — 36415 COLL VENOUS BLD VENIPUNCTURE: CPT | Performed by: HOSPITALIST

## 2024-11-02 PROCEDURE — 250N000013 HC RX MED GY IP 250 OP 250 PS 637: Performed by: HOSPITALIST

## 2024-11-02 PROCEDURE — 99232 SBSQ HOSP IP/OBS MODERATE 35: CPT | Performed by: INTERNAL MEDICINE

## 2024-11-02 PROCEDURE — 99207 PR NO BILLABLE SERVICE THIS VISIT: CPT | Performed by: STUDENT IN AN ORGANIZED HEALTH CARE EDUCATION/TRAINING PROGRAM

## 2024-11-02 PROCEDURE — 250N000012 HC RX MED GY IP 250 OP 636 PS 637: Performed by: INTERNAL MEDICINE

## 2024-11-02 PROCEDURE — 86140 C-REACTIVE PROTEIN: CPT | Performed by: HOSPITALIST

## 2024-11-02 PROCEDURE — 99239 HOSP IP/OBS DSCHRG MGMT >30: CPT | Performed by: STUDENT IN AN ORGANIZED HEALTH CARE EDUCATION/TRAINING PROGRAM

## 2024-11-02 RX ORDER — PREDNISONE 20 MG/1
20 TABLET ORAL DAILY
Qty: 5 TABLET | Refills: 0 | Status: SHIPPED | OUTPATIENT
Start: 2024-11-11 | End: 2024-11-16

## 2024-11-02 RX ORDER — PREDNISONE 10 MG/1
10 TABLET ORAL DAILY
Qty: 5 TABLET | Refills: 0 | Status: SHIPPED | OUTPATIENT
Start: 2024-11-21 | End: 2024-11-26

## 2024-11-02 RX ORDER — PREDNISONE 20 MG/1
40 TABLET ORAL DAILY
Qty: 10 TABLET | Refills: 0 | Status: SHIPPED | OUTPATIENT
Start: 2024-11-03 | End: 2024-11-08

## 2024-11-02 RX ORDER — PREDNISONE 5 MG/1
5 TABLET ORAL DAILY
Qty: 5 TABLET | Refills: 0 | Status: SHIPPED | OUTPATIENT
Start: 2024-11-26 | End: 2024-12-01

## 2024-11-02 RX ORDER — PREDNISONE 10 MG/1
30 TABLET ORAL DAILY
Qty: 15 TABLET | Refills: 0 | Status: SHIPPED | OUTPATIENT
Start: 2024-11-06 | End: 2024-11-11

## 2024-11-02 RX ORDER — COLCHICINE 0.6 MG/1
0.3 TABLET ORAL DAILY
Qty: 15 TABLET | Refills: 0 | Status: SHIPPED | OUTPATIENT
Start: 2024-11-03 | End: 2024-12-03

## 2024-11-02 RX ORDER — PREDNISONE 5 MG/1
15 TABLET ORAL DAILY
Qty: 15 TABLET | Refills: 0 | Status: SHIPPED | OUTPATIENT
Start: 2024-11-16 | End: 2024-11-21

## 2024-11-02 RX ORDER — AMIODARONE HYDROCHLORIDE 200 MG/1
TABLET ORAL
Qty: 41 TABLET | Refills: 0 | Status: SHIPPED | OUTPATIENT
Start: 2024-11-02 | End: 2024-11-29

## 2024-11-02 RX ADMIN — COLCHICINE 0.3 MG: 0.6 TABLET ORAL at 08:19

## 2024-11-02 RX ADMIN — AMIODARONE HYDROCHLORIDE 400 MG: 200 TABLET ORAL at 08:19

## 2024-11-02 RX ADMIN — LISINOPRIL 20 MG: 20 TABLET ORAL at 08:17

## 2024-11-02 RX ADMIN — APIXABAN 5 MG: 5 TABLET, FILM COATED ORAL at 08:19

## 2024-11-02 RX ADMIN — PREDNISONE 40 MG: 20 TABLET ORAL at 08:19

## 2024-11-02 RX ADMIN — PANTOPRAZOLE SODIUM 40 MG: 40 TABLET, DELAYED RELEASE ORAL at 06:36

## 2024-11-02 ASSESSMENT — ACTIVITIES OF DAILY LIVING (ADL)
ADLS_ACUITY_SCORE: 0

## 2024-11-02 NOTE — PROGRESS NOTES
[facility]    Medicine Progress Note - Hospitalist Service    Date of Admission:  11/1/2024    Assessment & Plan     Ms. Carri Hauesr is a 68 year old female with recurrent subacute pericarditis, hypertension, hyperlipidemia, PAF s/p PVI (2016), who presented with worsening chest pain.     Changes today:  -Some bradycardia overnight, hold metoprolol  -Denies any chest pain or shortness of breathe, is otherwise doing well.    Recurrent pericarditis: Etiology unclear.  If original pericarditis was autoimmune, recent antibiotics could conceivably increased risk of recurrence.  Has rosacea and questionable thyroid history, otherwise no obvious autoimmune disease.  No family history of lupus, polymyositis, Sjogren syndrome, or rheumatoid arthritis.  -cardiac tele  -Cardiology consult  -Prednisone taper per cardiology  -PTA colchicine 0.6 mg twice daily  -PTA omeprazole 20 mg daily    Atrial fibrillation with rapid ventricular response-Improved/resolved  -Cardiac telemetry with cardiology consult as above  -Check magnesium  -Check TSH  -PTA metoprolol succinate 25 mg daily currently held for bradycardia, is undergoing amnio oral loading per cardiology  -Apixaban 5 mg twice daily  Essential hypertension  -PTA lisinopril 20 mg daily    Elevated CRP: Colchicine can increase susceptibility for certain infections  -Recommended ED obtain blood culture  -AM CRP    Elevated anion gap without metabolic acidosis:-Resolved does not appear intoxicated, denies salicylates or acetaminophen      Hyperlipidemia  -PTA rosuvastatin 10 mg daily    Rosacea          Diet: Low Saturated Fat Na <2400 mg    DVT Prophylaxis: DOAC  [place] Catheter: Not present  Lines: None     Cardiac Monitoring: ACTIVE order. Indication: Tachyarrhythmias, acute (48 hours)  Code Status: Full Code      Clinically Significant Risk Factors Present on Admission                   # Hypertension: Noted on problem list         # Obesity:  "Estimated body mass index is 31.22 kg/m  as calculated from the following:    Height as of this encounter: 1.727 m (5' 8\").    Weight as of this encounter: 93.1 kg (205 lb 4.8 oz).              Disposition Plan     Medically Ready for Discharge: Anticipated Tomorrow             Sylvain Hutchison MD  Hospitalist Service  [facility]  Securely message with [employer] (more info)  Text page via [email or web address] Paging/Directory   ______________________________________________________________________    Interval History   No acute vents overnight had some asymptomatic bradycardia picked up on telemetry today she reports she feels much better than yesterday denies any shortness of breath or chest pain at this time she is otherwise doing well.    Physical Exam   Vital Signs: Temp: 97.4  F (36.3  C) Temp src: Oral BP: (!) 158/88 Pulse: 78   Resp: 22 SpO2: 98 % O2 Device: None (Room air)    Weight: 205 lbs 4.8 oz    Gen: Appears well, NAD, on RA  Card: Irregularly irregular rhythm, regular rate, no murmurs rubs or gallops  Pulm: Normal I/E effort on RA  Abd:Non distended  Skin:No obvious rashes or lesions on exposed areas of skin  Neuro AxOx4, S/S grossly intact, no obvious FND,   Psych:Pleasant, answering questions appropriately, insight good, judgement good, does not appear to be responding to I/E stimuli     Medical Decision Making       45 MINUTES SPENT BY ME on the date of service doing chart review, history, exam, documentation & further activities per the note.      Data   ------------------------- PAST 24 HR DATA REVIEWED -----------------------------------------------    I have personally reviewed the following data over the past 24 hrs:    N/A  \   N/A   / N/A     139 104 14.7 /  200 (H)   3.7 20 (L) 0.66 \     Trop: 9 BNP: N/A     TSH: 3.25 T4: N/A A1C: N/A     Procal: N/A CRP: 119.00 (H) Lactic Acid: N/A         Imaging results reviewed over the past 24 hrs:   No results found for this or any previous " visit (from the past 24 hours).

## 2024-11-02 NOTE — DISCHARGE SUMMARY
"[facility]  Hospitalist Discharge Summary      Date of Admission:  11/1/2024  Date of Discharge:  11/2/2024  Discharging Provider: Sylvain Hutchison MD  Discharge Service: Hospitalist Service    Discharge Diagnoses   Recurrent idiopathic acute pericarditis  Atrial fibrillation with rapid ventricular response, tachybradycardia physiology    Clinically Significant Risk Factors     # Obesity: Estimated body mass index is 31.22 kg/m  as calculated from the following:    Height as of this encounter: 1.727 m (5' 8\").    Weight as of this encounter: 93.1 kg (205 lb 4.8 oz).       Follow-ups Needed After Discharge   Follow-up Appointments       Follow-up and recommended labs and tests       Follow up with primary care provider, Ruba Mendez, within 7 days to evaluate medication change and for hospital follow- up.  The following labs/tests are recommended: cbc/bmp.      Follow up with cardiology as requested            {Additional follow-up instructions/to-do's for PCP        Unresulted Labs Ordered in the Past 30 Days of this Admission       Date and Time Order Name Status Description    11/1/2024 12:40 PM Blood Culture Peripheral Blood Preliminary         These results will be followed up by    Discharge Disposition   Discharged to home  Condition at discharge: Stable    Hospital Course   Ms. Carri Hauser is a 68 year old female with recurrent subacute pericarditis, hypertension, hyperlipidemia, PAF s/p PVI (2016), who presented with worsening chest pain.  Cardiology was consulted started patient on amiodarone as well as a prolonged steroid taper given amiodarone her colchicine was originally adjusted to 0.6 mg twice daily however was decreased given to the drug-drug interactions with amiodarone.  Her metoprolol was discontinued as she had some overnight bradycardia, on day of discharge heart rates were well-controlled cleared by cardiac standpoint for discharge home she was asymptomatic " pain-free with no chest pain or shortness of breath on day of discharge.  She should follow-up as instructed with cardiology in 2 weeks.  She should avoid NSAIDs now that she is on apixaban for her A-fib with RVR.    Consultations This Hospital Stay   CARDIOLOGY IP CONSULT  PHARMACY LIAISON FOR MEDICATION COVERAGE CONSULT    Code Status   Full Code    Time Spent on this Encounter   Sylvain CARRILLO MD, personally saw the patient today and spent greater than 30 minutes discharging this patient.       Sylvain Hutchison MD  [facility]  [address] [place] [id number]  Phone: [phone]  Fax: [phone]  ______________________________________________________________________    Physical Exam   Vital Signs: Temp: 97.4  F (36.3  C) Temp src: Oral BP: (!) 158/88 Pulse: 78   Resp: 22 SpO2: 98 % O2 Device: None (Room air)    Weight: 205 lbs 4.8 oz  Gen: Appears well, NAD, on RA  Card: Irregularly irregular, no murmurs rubs or gallops.  Pulm: Normal I/E effort on RA  Abd:Non distended  Skin:No obvious rashes or lesions on exposed areas of skin  Neuro AxOx4, S/S grossly intact, no obvious FND,   Psych:Pleasant, answering questions appropriately, insight good, judgement good, does not appear to be responding to I/E stimuli        Primary Care Physician   Ruba Mendez    Discharge Orders      Reason for your hospital stay    Recurrent pericarditis, A-fib with RVR tacky/bradycardia syndrome.     Follow-up and recommended labs and tests     Follow up with primary care provider, Ruba Mendez, within 7 days to evaluate medication change and for hospital follow- up.  The following labs/tests are recommended: cbc/bmp.      Follow up with cardiology as requested     Activity    Your activity upon discharge: activity as tolerated     Diet    Follow this diet upon discharge: Current Diet:Orders Placed This Encounter      Low Saturated Fat Na <2400 mg       Significant Results and  Procedures   Results for orders placed or performed during the hospital encounter of 11/01/24   POC US ECHO LIMITED    Narrative    Zac Rutledge MD     11/1/2024 10:58 AM  POC US ECHO LIMITED    Date/Time: 11/1/2024 10:56 AM    Performed by: Zac Rutledge MD  Authorized by: Zac Rutledge MD    Comments:      Indications: Chest pain, history of pericarditis  Views obtained: Parasternal long, parasternal short, apical four-chamber,   subxiphoid, bilateral thoracic lung views  Findings: Heart rate is tachycardic and rhythm is irregular.  Systolic   performance is grossly normal.  No  appearance of RV enlargement.  No   pericardial effusion.  Lung windows clear without significant b-lines.    Impression: No pericardial effusion, systolic performance is grossly   normal.  Rate is tachycardic and rhythm is irregular.    Images are being saved and should be available for review in PACS.   Chest XR,  PA & LAT    Narrative    EXAM: XR CHEST 2 VIEWS  LOCATION: [facility]  DATE: 11/1/2024    INDICATION: Dyspnea  COMPARISON: 2/20/2024      Impression    IMPRESSION: Slight hyperinflation. Heart size upper limits of normal. Chest otherwise negative. No acute findings.       Discharge Medications   Current Discharge Medication List        START taking these medications    Details   amiodarone (PACERONE) 200 MG tablet Take 2 tablets (400 mg) by mouth or FT or NG tube 2 times daily for 4 days, THEN 1 tablet (200 mg) 2 times daily for 2 days, THEN 1 tablet (200 mg) daily for 21 days.  Qty: 41 tablet, Refills: 0    Associated Diagnoses: AF (paroxysmal atrial fibrillation) (H)      apixaban ANTICOAGULANT (ELIQUIS) 5 MG tablet Take 1 tablet (5 mg) by mouth 2 times daily.  Qty: 60 tablet, Refills: 0    Associated Diagnoses: AF (paroxysmal atrial fibrillation) (H)      !! predniSONE (DELTASONE) 10 MG tablet Take 3 tablets (30 mg) by mouth daily for 5 doses.  Qty: 15 tablet, Refills: 0    Associated  Diagnoses: Acute idiopathic pericarditis      !! predniSONE (DELTASONE) 10 MG tablet Take 1 tablet (10 mg) by mouth daily for 5 doses.  Qty: 5 tablet, Refills: 0    Associated Diagnoses: Acute idiopathic pericarditis      !! predniSONE (DELTASONE) 20 MG tablet Take 2 tablets (40 mg) by mouth daily for 5 doses.  Qty: 10 tablet, Refills: 0    Associated Diagnoses: Acute idiopathic pericarditis      !! predniSONE (DELTASONE) 20 MG tablet Take 1 tablet (20 mg) by mouth daily for 5 doses.  Qty: 5 tablet, Refills: 0    Associated Diagnoses: Acute idiopathic pericarditis      !! predniSONE (DELTASONE) 5 MG tablet Take 3 tablets (15 mg) by mouth daily for 5 doses.  Qty: 15 tablet, Refills: 0    Associated Diagnoses: Acute idiopathic pericarditis      !! predniSONE (DELTASONE) 5 MG tablet Take 1 tablet (5 mg) by mouth daily for 5 doses.  Qty: 5 tablet, Refills: 0    Associated Diagnoses: Acute idiopathic pericarditis       !! - Potential duplicate medications found. Please discuss with provider.        CONTINUE these medications which have CHANGED    Details   colchicine (COLCRYS) 0.6 MG tablet Take 0.5 tablets (0.3 mg) by mouth daily.  Qty: 15 tablet, Refills: 0    Associated Diagnoses: Acute idiopathic pericarditis           CONTINUE these medications which have NOT CHANGED    Details   lisinopril (ZESTRIL) 20 MG tablet Take 20 mg by mouth daily.      omeprazole (PRILOSEC) 20 MG DR capsule Take 20 mg by mouth daily.      rosuvastatin (CRESTOR) 10 MG tablet Take 10 mg by mouth daily.      triamcinolone (KENALOG) 0.1 % external cream Apply topically 3 times daily as needed for irritation.           STOP taking these medications       metoprolol succinate ER (TOPROL XL) 25 MG 24 hr tablet Comments:   Reason for Stopping:             Allergies   Allergies   Allergen Reactions    Pseudoephedrine Other (See Comments)     Other Reaction(s): Tachycardia    Tetracycline Hives    Adhesive Tape Rash     Echocardiogram stickers     Tylenol [Acetaminophen] Rash     All over chest

## 2024-11-02 NOTE — PLAN OF CARE
Goal Outcome Evaluation:      Plan of Care Reviewed With: patient    Overall Patient Progress: improvingOverall Patient Progress: improving    Outcome Evaluation: Denies chast pain. Waldo to the 40s while sleeping. going back and forth with SR to Afib RVR. Elevated BP, on RA. IND in the room. Pt had questions about her BP meds gino and question were answered by the writer.       Problem: Chest Pain  Goal: Resolution of Chest Pain Symptoms  Outcome: Improve.    Problem: Comorbidity Management  Goal: Blood Pressure in Desired Range  Outcome: No change   Intervention: Maintain Blood Pressure Management  Recent Flowsheet Documentation  Taken 11/2/2024 0020 by Jeniffer Bowen, RN  Medication Review/Management: medications reviewed  Taken 11/1/2024 1957 by Jeniffer Bowen RN  Medication Review/Management: medications reviewed        Problem: Dysrhythmia  Goal: Normalized Cardiac Rhythm  Outcome: No change.

## 2024-11-02 NOTE — PROGRESS NOTES
[facility]   [address]  [place] [address]   [email or web address]   OFFICE: [phone]     CARDIOLOGY FOLLOW-UP NOTE      Impression and Plan     Impression:   Recurrent acute idiopathic pericarditis - failing therapy with ibuprofen and colchicine. Due to failure of therapy of NSAIDs will utilized a 30-day prednisone taper.  Paroxysmal atrial fibrillation with RVR and tachy-zak physiology. Not currently on anticoagulation. CHADS2-vasc score of 3  Hypertension - BP is elevated    Plan:  Continue prednisone taper  Continue eliquis  Continue amiodarone  Colchicine was decreased due to interactions with amiodarone  Could use metoprolol as needed for recurrent afib with RVR.  Avoid NSAIDs with OAC  Okay for discharge from cardiac standpoint.  She should follow-up with Dr. Trevizo within 2 weeks with a CRP.    Discussed with Dr. Hutchison    Primary Cardiologist: Dr. Trevizo ([facility])    Subjective     Feeling much improved. No further chest pain.     Cardiac Diagnostics   Telemetry (personally reviewed): sinus rhythm,    Echocardiogram (results reviewed):   TTE 9/16/24  Left ventricular size, wall motion and function are normal. The ejection  fraction is 60-65%.  Normal right ventricle size and systolic function.  There is no pericardial effusion.  Aortic root and ascending dilatation is present.  IVC diameter >2.1 cm collapsing <50% with sniff suggests a high RA pressure  estimated at 15 mmHg or greater.  No hemodynamically significant valvular abnormalities on 2D or color flow  imaging.     Cardiac MRI 10/24/24  -Cardiac MRI findings consistent with subacute pericarditis with no evidence of myocarditis. There is mild thickening of the pericardium 3-4 mm over the right ventricle and anterior left ventricle with associated late gadolinium enhancement.  On T2 weighted imaging, there is mild hyperintensity of the pericardium overlying the basal right ventricle consistent  "with edema/inflammation.  There is prominent epicardial fat.    There is no pericardial free fluid. There is no evidence of pericardial constriction.   -The left ventricle is normal in size with normal systolic function. Quantitative LVEF 65 %.  There are no   regional wall motion abnormalities.   -The right ventricle is normal in size with normal systolic function. Quantitative RVEF 65 %.   -There is no replacement fibrosis or prior myocardial infarction.   -The left atrium is moderately dilated.   -There is no significant valvular heart disease.     Physical Examination       BP (!) 158/88   Pulse 78   Temp 97.4  F (36.3  C) (Oral)   Resp 22   Ht 1.727 m (5' 8\")   Wt 93.1 kg (205 lb 4.8 oz)   SpO2 98%   BMI 31.22 kg/m        [unfilled]        Intake/Output Summary (Last 24 hours) at 11/2/2024 1230  Last data filed at 11/2/2024 0800  Gross per 24 hour   Intake 713 ml   Output --   Net 713 ml       General: pleasant female. No acute distress.   HENT: external ears normal. Nares patent. Mucous membranes moist.  Eyes: perrla, extraocular muscles intact. No scleral icterus.   Neck: No JVD  Lungs: clear to auscultation  COR: regular rate and rhythm, No murmurs, rubs, or gallops  Abd: nondistended, BS present  Extrem: No edema         Imaging      No new non-cardiac imaging.    Lab Results   Lab Results   Component Value Date    BUN 14.7 11/02/2024     11/02/2024    CO2 20 (L) 11/02/2024       Lab Results   Component Value Date    WBC 8.8 11/01/2024    HGB 12.7 11/01/2024    HCT 38.3 11/01/2024    MCV 85 11/01/2024     11/01/2024       Lab Results   Component Value Date    TSH 3.25 11/01/2024               Current Inpatient Scheduled Medications   Scheduled Meds:  Current Facility-Administered Medications   Medication Dose Route Frequency Provider Last Rate Last Admin    amiodarone (PACERONE) tablet 400 mg  400 mg Oral or FT or NG tube BID Roman Herrmann MD   400 mg at 11/02/24 0819    Followed by    " [START ON 11/5/2024] amiodarone (PACERONE) tablet 200 mg  200 mg Oral or FT or NG tube BID Roman Herrmann MD        Followed by    [START ON 11/8/2024] amiodarone (PACERONE) tablet 200 mg  200 mg Oral or FT or NG tube Daily Roman Herrmann MD        apixaban ANTICOAGULANT (ELIQUIS) tablet 5 mg  5 mg Oral BID Roman Herrmann MD   5 mg at 11/02/24 0819    colchicine half-tab 0.3 mg  0.3 mg Oral Daily Roman Herrmann MD   0.3 mg at 11/02/24 0819    lisinopril (ZESTRIL) tablet 20 mg  20 mg Oral Daily Scott Mendenhall MD   20 mg at 11/02/24 0817    [Held by provider] metoprolol succinate ER (TOPROL XL) 24 hr tablet 25 mg  25 mg Oral Daily Sylvain Hutchison MD   25 mg at 11/01/24 1700    pantoprazole (PROTONIX) EC tablet 40 mg  40 mg Oral Daily Scott Mendenhall MD   40 mg at 11/02/24 0636    predniSONE (DELTASONE) tablet 40 mg  40 mg Oral Daily Roman Herrmann MD   40 mg at 11/02/24 0819    Followed by    [START ON 11/6/2024] predniSONE (DELTASONE) tablet 30 mg  30 mg Oral Daily Roman Herrmann MD        Followed by    [START ON 11/11/2024] predniSONE (DELTASONE) tablet 20 mg  20 mg Oral Daily Roman Herrmann MD        Followed by    [START ON 11/16/2024] predniSONE (DELTASONE) tablet 15 mg  15 mg Oral Daily Roman Herrmann MD        Followed by    [START ON 11/21/2024] predniSONE (DELTASONE) tablet 10 mg  10 mg Oral Daily Roman Herrmann MD        Followed by    [START ON 11/26/2024] predniSONE (DELTASONE) tablet 5 mg  5 mg Oral Daily Roman Herrmann MD        rosuvastatin (CRESTOR) tablet 10 mg  10 mg Oral QPM Scott Mendenhall MD   10 mg at 11/01/24 2008    sodium chloride (PF) 0.9% PF flush 3 mL  3 mL Intracatheter Q8H Scott Mendenhall MD   3 mL at 11/02/24 0641     Continuous Infusions:  Current Facility-Administered Medications   Medication Dose Route Frequency Provider Last Rate Last Admin            Medications Prior to Admission   Prior to Admission medications    Medication Sig Start Date End Date Taking? Authorizing  Provider   colchicine (COLCRYS) 0.6 MG tablet Take 1 tablet (0.6 mg) by mouth 2 times daily. 9/17/24  Yes Elgin Trotter DO   lisinopril (ZESTRIL) 20 MG tablet Take 20 mg by mouth daily.   Yes Unknown, Entered By History   metoprolol succinate ER (TOPROL XL) 25 MG 24 hr tablet Take 1 tablet (25 mg) by mouth daily. 9/17/24  Yes Elgin Trotter DO   omeprazole (PRILOSEC) 20 MG DR capsule Take 20 mg by mouth daily.   Yes Unknown, Entered By History   rosuvastatin (CRESTOR) 10 MG tablet Take 10 mg by mouth daily.   Yes Unknown, Entered By History   triamcinolone (KENALOG) 0.1 % external cream Apply topically 3 times daily as needed for irritation.   Yes Unknown, Entered By History

## 2024-11-02 NOTE — PLAN OF CARE
Patient Aox4, pleasant, cooperative. Denies pain. Vitally stable on room air. Ind in room. Discharge information reviewed with patient to her satisfaction. All questions answered. Patient discharged in stable condition via a private vehicle.

## 2024-11-03 LAB
ATRIAL RATE - MUSE: 133 BPM
ATRIAL RATE - MUSE: 394 BPM
ATRIAL RATE - MUSE: 66 BPM
DIASTOLIC BLOOD PRESSURE - MUSE: 63 MMHG
DIASTOLIC BLOOD PRESSURE - MUSE: NORMAL MMHG
DIASTOLIC BLOOD PRESSURE - MUSE: NORMAL MMHG
INTERPRETATION ECG - MUSE: NORMAL
P AXIS - MUSE: 58 DEGREES
P AXIS - MUSE: NORMAL DEGREES
P AXIS - MUSE: NORMAL DEGREES
PR INTERVAL - MUSE: 150 MS
PR INTERVAL - MUSE: NORMAL MS
PR INTERVAL - MUSE: NORMAL MS
QRS DURATION - MUSE: 66 MS
QRS DURATION - MUSE: 70 MS
QRS DURATION - MUSE: 72 MS
QT - MUSE: 262 MS
QT - MUSE: 316 MS
QT - MUSE: 428 MS
QTC - MUSE: 394 MS
QTC - MUSE: 448 MS
QTC - MUSE: 489 MS
R AXIS - MUSE: 34 DEGREES
R AXIS - MUSE: 41 DEGREES
R AXIS - MUSE: 47 DEGREES
SYSTOLIC BLOOD PRESSURE - MUSE: 122 MMHG
SYSTOLIC BLOOD PRESSURE - MUSE: NORMAL MMHG
SYSTOLIC BLOOD PRESSURE - MUSE: NORMAL MMHG
T AXIS - MUSE: 109 DEGREES
T AXIS - MUSE: 129 DEGREES
T AXIS - MUSE: 88 DEGREES
VENTRICULAR RATE- MUSE: 136 BPM
VENTRICULAR RATE- MUSE: 144 BPM
VENTRICULAR RATE- MUSE: 66 BPM

## 2024-11-04 ENCOUNTER — PATIENT OUTREACH (OUTPATIENT)
Dept: CARE COORDINATION | Facility: CLINIC | Age: 69
End: 2024-11-04
Payer: COMMERCIAL

## 2024-11-04 NOTE — PROGRESS NOTES
[facility]: Transitions of Care Outreach  Chief Complaint   Patient presents with    Clinic Care Coordination - Post Hospital       Most Recent Admission Date: 11/1/2024   Most Recent Admission Diagnosis: Acute idiopathic pericarditis - I30.0  Atrial fibrillation with RVR (H) - I48.91     Most Recent Discharge Date: 11/2/2024   Most Recent Discharge Diagnosis: Atrial fibrillation with RVR (H) - I48.91  Acute idiopathic pericarditis - I30.0  Primary hypertension - I10  AF (paroxysmal atrial fibrillation) (H) - I48.0     Transitions of Care Assessment    Discharge Assessment  How are you doing now that you are home?: Yesterday was not as good but today I am better.  How are your symptoms? (Red Flag symptoms escalate to triage hotline per guidelines): Improved  Do you know how to contact your clinic care team if you have future questions or changes to your health status? : Yes  Does the patient have their discharge instructions? : Yes  Does the patient have questions regarding their discharge instructions? : No  Were you started on any new medications or were there changes to any of your previous medications? : Yes  Does the patient have all of their medications?: Yes  Do you have questions regarding any of your medications? : No                  Follow up Plan     Discharge Follow-Up  Discharge follow up appointment scheduled in alignment with recommended follow up timeframe or Transitions of Risk Category? (Low = within 30 days; Moderate= within 14 days; High= within 7 days): Yes  Discharge Follow Up Appointment Date: 11/06/24  Discharge Follow Up Appointment Scheduled with?: Specialty Care Provider    No future appointments.    Outpatient Plan as outlined on AVS reviewed with patient.    For any urgent concerns, please contact our 24 hour nurse triage line: [phone] ([phone])       ALICIA Gracia  [phone]

## 2024-11-06 LAB — BACTERIA BLD CULT: NO GROWTH

## 2024-11-24 ENCOUNTER — APPOINTMENT (OUTPATIENT)
Dept: RADIOLOGY | Facility: CLINIC | Age: 69
End: 2024-11-24
Attending: EMERGENCY MEDICINE
Payer: COMMERCIAL

## 2024-11-24 ENCOUNTER — HOSPITAL ENCOUNTER (EMERGENCY)
Facility: CLINIC | Age: 69
Discharge: HOME | End: 2024-11-24
Attending: EMERGENCY MEDICINE | Admitting: EMERGENCY MEDICINE
Payer: COMMERCIAL

## 2024-11-24 VITALS
HEIGHT: 68 IN | WEIGHT: 204 LBS | BODY MASS INDEX: 30.92 KG/M2 | HEART RATE: 72 BPM | SYSTOLIC BLOOD PRESSURE: 144 MMHG | OXYGEN SATURATION: 100 % | TEMPERATURE: 97.7 F | RESPIRATION RATE: 19 BRPM | DIASTOLIC BLOOD PRESSURE: 93 MMHG

## 2024-11-24 DIAGNOSIS — I48.91 ATRIAL FIBRILLATION WITH RAPID VENTRICULAR RESPONSE (H): ICD-10-CM

## 2024-11-24 DIAGNOSIS — E83.42 HYPOMAGNESEMIA: ICD-10-CM

## 2024-11-24 LAB
ALBUMIN SERPL BCG-MCNC: 4.1 G/DL (ref 3.5–5.2)
ALP SERPL-CCNC: 78 U/L (ref 40–150)
ALT SERPL W P-5'-P-CCNC: 25 U/L (ref 0–50)
ANION GAP SERPL CALCULATED.3IONS-SCNC: 14 MMOL/L (ref 7–15)
APTT PPP: 27 SECONDS (ref 22–38)
AST SERPL W P-5'-P-CCNC: 20 U/L (ref 0–45)
ATRIAL RATE - MUSE: 340 BPM
BASOPHILS # BLD AUTO: 0 10E3/UL (ref 0–0.2)
BASOPHILS NFR BLD AUTO: 0 %
BILIRUB SERPL-MCNC: 0.4 MG/DL
BUN SERPL-MCNC: 9.8 MG/DL (ref 8–23)
CALCIUM SERPL-MCNC: 9.5 MG/DL (ref 8.8–10.4)
CHLORIDE SERPL-SCNC: 104 MMOL/L (ref 98–107)
CREAT SERPL-MCNC: 0.9 MG/DL (ref 0.51–0.95)
CRP SERPL-MCNC: 16.5 MG/L
D DIMER PPP FEU-MCNC: <=0.27 UG/ML FEU (ref 0–0.5)
DIASTOLIC BLOOD PRESSURE - MUSE: NORMAL MMHG
EGFRCR SERPLBLD CKD-EPI 2021: 70 ML/MIN/1.73M2
EOSINOPHIL # BLD AUTO: 0.1 10E3/UL (ref 0–0.7)
EOSINOPHIL NFR BLD AUTO: 2 %
ERYTHROCYTE [DISTWIDTH] IN BLOOD BY AUTOMATED COUNT: 14.4 % (ref 10–15)
ERYTHROCYTE [SEDIMENTATION RATE] IN BLOOD BY WESTERGREN METHOD: 15 MM/HR (ref 0–30)
GLUCOSE SERPL-MCNC: 207 MG/DL (ref 70–99)
HCO3 SERPL-SCNC: 23 MMOL/L (ref 22–29)
HCT VFR BLD AUTO: 40.8 % (ref 35–47)
HGB BLD-MCNC: 13.3 G/DL (ref 11.7–15.7)
HOLD SPECIMEN: NORMAL
IMM GRANULOCYTES # BLD: 0 10E3/UL
IMM GRANULOCYTES NFR BLD: 0 %
INR PPP: 1.12 (ref 0.85–1.15)
INTERPRETATION ECG - MUSE: NORMAL
LYMPHOCYTES # BLD AUTO: 1.8 10E3/UL (ref 0.8–5.3)
LYMPHOCYTES NFR BLD AUTO: 25 %
MAGNESIUM SERPL-MCNC: 1.6 MG/DL (ref 1.7–2.3)
MCH RBC QN AUTO: 28.2 PG (ref 26.5–33)
MCHC RBC AUTO-ENTMCNC: 32.6 G/DL (ref 31.5–36.5)
MCV RBC AUTO: 86 FL (ref 78–100)
MONOCYTES # BLD AUTO: 0.5 10E3/UL (ref 0–1.3)
MONOCYTES NFR BLD AUTO: 7 %
NEUTROPHILS # BLD AUTO: 4.8 10E3/UL (ref 1.6–8.3)
NEUTROPHILS NFR BLD AUTO: 66 %
NRBC # BLD AUTO: 0 10E3/UL
NRBC BLD AUTO-RTO: 0 /100
NT-PROBNP SERPL-MCNC: 394 PG/ML (ref 0–900)
P AXIS - MUSE: NORMAL DEGREES
PLATELET # BLD AUTO: 167 10E3/UL (ref 150–450)
POTASSIUM SERPL-SCNC: 3.6 MMOL/L (ref 3.4–5.3)
PR INTERVAL - MUSE: NORMAL MS
PROT SERPL-MCNC: 7 G/DL (ref 6.4–8.3)
QRS DURATION - MUSE: 76 MS
QT - MUSE: 274 MS
QTC - MUSE: 400 MS
R AXIS - MUSE: 38 DEGREES
RBC # BLD AUTO: 4.72 10E6/UL (ref 3.8–5.2)
SODIUM SERPL-SCNC: 141 MMOL/L (ref 135–145)
SYSTOLIC BLOOD PRESSURE - MUSE: NORMAL MMHG
T AXIS - MUSE: 193 DEGREES
TROPONIN T SERPL HS-MCNC: 15 NG/L
TROPONIN T SERPL HS-MCNC: 16 NG/L
VENTRICULAR RATE- MUSE: 128 BPM
WBC # BLD AUTO: 7.2 10E3/UL (ref 4–11)

## 2024-11-24 PROCEDURE — 250N000011 HC RX IP 250 OP 636: Performed by: EMERGENCY MEDICINE

## 2024-11-24 PROCEDURE — 93005 ELECTROCARDIOGRAM TRACING: CPT | Performed by: EMERGENCY MEDICINE

## 2024-11-24 PROCEDURE — 83735 ASSAY OF MAGNESIUM: CPT | Performed by: EMERGENCY MEDICINE

## 2024-11-24 PROCEDURE — 99285 EMERGENCY DEPT VISIT HI MDM: CPT | Mod: 25

## 2024-11-24 PROCEDURE — 96366 THER/PROPH/DIAG IV INF ADDON: CPT

## 2024-11-24 PROCEDURE — 85652 RBC SED RATE AUTOMATED: CPT | Performed by: EMERGENCY MEDICINE

## 2024-11-24 PROCEDURE — 71046 X-RAY EXAM CHEST 2 VIEWS: CPT

## 2024-11-24 PROCEDURE — 85004 AUTOMATED DIFF WBC COUNT: CPT | Performed by: EMERGENCY MEDICINE

## 2024-11-24 PROCEDURE — 84484 ASSAY OF TROPONIN QUANT: CPT | Performed by: EMERGENCY MEDICINE

## 2024-11-24 PROCEDURE — 86140 C-REACTIVE PROTEIN: CPT | Performed by: EMERGENCY MEDICINE

## 2024-11-24 PROCEDURE — 82040 ASSAY OF SERUM ALBUMIN: CPT | Performed by: EMERGENCY MEDICINE

## 2024-11-24 PROCEDURE — 36415 COLL VENOUS BLD VENIPUNCTURE: CPT | Performed by: EMERGENCY MEDICINE

## 2024-11-24 PROCEDURE — 80053 COMPREHEN METABOLIC PANEL: CPT | Performed by: EMERGENCY MEDICINE

## 2024-11-24 PROCEDURE — 96365 THER/PROPH/DIAG IV INF INIT: CPT

## 2024-11-24 PROCEDURE — 85610 PROTHROMBIN TIME: CPT | Performed by: EMERGENCY MEDICINE

## 2024-11-24 PROCEDURE — 83880 ASSAY OF NATRIURETIC PEPTIDE: CPT | Performed by: EMERGENCY MEDICINE

## 2024-11-24 PROCEDURE — 85730 THROMBOPLASTIN TIME PARTIAL: CPT | Performed by: EMERGENCY MEDICINE

## 2024-11-24 PROCEDURE — 85379 FIBRIN DEGRADATION QUANT: CPT | Performed by: EMERGENCY MEDICINE

## 2024-11-24 RX ORDER — MAGNESIUM SULFATE HEPTAHYDRATE 40 MG/ML
2 INJECTION, SOLUTION INTRAVENOUS ONCE
Status: COMPLETED | OUTPATIENT
Start: 2024-11-24 | End: 2024-11-24

## 2024-11-24 RX ORDER — KETOROLAC TROMETHAMINE 15 MG/ML
15 INJECTION, SOLUTION INTRAMUSCULAR; INTRAVENOUS ONCE
Status: COMPLETED | OUTPATIENT
Start: 2024-11-24 | End: 2024-11-24

## 2024-11-24 RX ADMIN — MAGNESIUM SULFATE HEPTAHYDRATE 2 G: 40 INJECTION, SOLUTION INTRAVENOUS at 17:14

## 2024-11-24 ASSESSMENT — ENCOUNTER SYMPTOMS
NAUSEA: 0
FEVER: 0
SHORTNESS OF BREATH: 1
COUGH: 0
CHILLS: 1
VOMITING: 0

## 2024-11-24 ASSESSMENT — ACTIVITIES OF DAILY LIVING (ADL)
ADLS_ACUITY_SCORE: 0

## 2024-11-24 NOTE — ED PROVIDER NOTES
EMERGENCY DEPARTMENT ENCOUNTER      NAME: Carri Hauser  AGE: 69 year old female  YOB: 1955  MRN: [id number]  EVALUATION DATE & TIME: 2024  3:01 PM    PCP: Ruba Mendez    ED PROVIDER: Maggie Juárez DO      Chief Complaint   Patient presents with    Chest Pain    Shortness of Breath         FINAL IMPRESSION:  1. Atrial fibrillation with rapid ventricular response (H)    2. Hypomagnesemia          ED COURSE & MEDICAL DECISION MAKIN-year-old female with a history of atrial fibrillation and recurrent idiopathic pericarditis who is currently on NSAIDs and amiodarone for this presented to the ED for evaluation of worsening chest pain and shortness of breath that she stated was consistent with her previous episodes of pericarditis.  Upon arrival to the ED the patient was tachycardic.  She was otherwise hemodynamic stable.  The patient did not appear to be in any obvious distress or discomfort and she was not acutely ill-appearing.  The patient's physical exam was unremarkable.     An EKG was obtained which revealed atrial fibrillation with rapid ventricular response with new ST and T wave changes.    Following initial evaluation the patient was given dose of IV Toradol to treat her pain.    CBC and CMP were both unremarkable.  The patient's troponin was 15.  The CRP was 16.5.  3 weeks ago the CRP was 119.  The patient's ESR was normal.  BNP was normal.  Magnesium was slightly low at 1.6.  D-dimer was normal.    Chest x-ray was nondiagnostic.    Looking at the telemetry monitor the patient appeared to be back in normal sinus rhythm.   The patient was then reevaluated and informed of the lab, x-ray, and EKG results.  The patient stated that her symptoms had all resolved at the time reevaluation.  Patient also states that she had not been given the IV Toradol.  The patient was therefore informed that her symptoms appear to be due to atrial fibrillation with rapid ventricular response.  The  patient was educated about A-fib with RVR and reassured.  The patient was informed that her slightly low magnesium level could be a contributing factor to the A-fib and other ectopy that has noted on the telemetry monitor.  The patient was given 2 g of IV magnesium here in the ED.  Following this the patient was discharged home with instructions to follow-up with her cardiologist.  The patient was instructed to return back to ED sooner for any repeat episodes of A-fib with RVR, worsening chest pain/chest pressure, shortness of breath, or any other new or concerning symptoms.        Pertinent Labs & Imaging studies reviewed. (See chart for details)  3:14 PM I met with the patient to gather history and to perform my initial exam. We discussed plans for the ED course, including diagnostic testing and treatment.       At the conclusion of the encounter I discussed the results of all of the tests and the disposition. The questions were answered. The patient or family acknowledged understanding and was agreeable with the care plan.     Medical Decision Making    History:  Supplemental history from: N/A  External Record(s) reviewed: Documented in chart    Work Up:  Chart documentation includes differential considered and any EKGs or imaging independently interpreted by provider, where specified.  In additional to work up documented, I considered the following work up: Documented in chart, if applicable.    External consultation:  Discussion of management with another provider: Documented in chart, if applicable    Complicating factors:  Care impacted by chronic illness: Heart Disease and Hypertension  Care affected by social determinants of health: N/A    Disposition considerations: Discharge. I recommended the patient continue their current prescription strength medication(s): Amiodarone and Eliquis. I considered admission, but discharged patient after significant clinical improvement.      PPE worn: n95 mask,  goggles    MEDICATIONS GIVEN IN THE EMERGENCY:  Medications   magnesium sulfate 2 g in 50 mL sterile water intermittent infusion (has no administration in time range)   ketorolac (TORADOL) injection 15 mg (15 mg Intravenous Not Given 11/24/24 1601)       NEW PRESCRIPTIONS STARTED AT TODAY'S ER VISIT  New Prescriptions    No medications on file          =================================================================    HPI    Patient information was obtained from: Patient    Use of : N/A        Carri Hauser is a 69 year old female with a pertinent history of atrial fibrillation, pericarditis, hypertension, GERD, and obesity who presents to this ED via private vehicle for evaluation of chest pain and shortness of breath.     Patient presents with chest pain that began in her right shoulder last night while reading, and now is generalized across her chest. Patient states that the pain begins in the right side of her chest and spreads to the left side. She notes that her chest pain has made it difficult for her to breathe, and that her chest pain and shortness of breath worsen with movement/exertion. She states that leaning forward makes it worse, and laying down makes it better. She also states that she has experienced these symptoms before, and that this is her third ED visit for this concern. In her first visit for this concern, she was diagnosed with pericarditis and started on an ibuprofen regimen. After stopping the ibuprofen, her chest pain began again, and she was seen in the ED again and started on amiodarone and prednisone. She also notes a history of atrial fibrillation and states she had an ablation done in 2015.     Patient denies pain or swelling in legs, cough, vomiting, fever, diaphoresis, or any other complaints at this time.       Chart Review:  11/1/24-11/2/24: Patient was admitted to [facility] for acute idiopathic pericarditis and atrial fibrillation with RVR. Cardiology was  consulted and recommended starting patient on amiodarone as well as a prolonged steroid taper. On day of discharge, heart rates were well-controlled and patient cleared from a cardiac standpoint for discharge home. Patient was asymptomatic with no chest pain or shortness of breath on day of discharge. Patient was to follow up with cardiology in 2 weeks, and notified that she should avoid NSAIDs now that she is on apixaban for atrial fibrillation. Patient discharged.     REVIEW OF SYSTEMS   Review of Systems   Constitutional:  Positive for chills. Negative for fever.   Respiratory:  Positive for shortness of breath. Negative for cough.    Cardiovascular:  Positive for chest pain. Negative for leg swelling.   Gastrointestinal:  Negative for nausea and vomiting.   All other systems reviewed and are negative.      PAST MEDICAL HISTORY:  No past medical history on file.    PAST SURGICAL HISTORY:  No past surgical history on file.        CURRENT MEDICATIONS:    amiodarone (PACERONE) 200 MG tablet  apixaban ANTICOAGULANT (ELIQUIS) 5 MG tablet  colchicine (COLCRYS) 0.6 MG tablet  lisinopril (ZESTRIL) 20 MG tablet  omeprazole (PRILOSEC) 20 MG DR capsule  predniSONE (DELTASONE) 10 MG tablet  [START ON 11/26/2024] predniSONE (DELTASONE) 5 MG tablet  rosuvastatin (CRESTOR) 10 MG tablet  triamcinolone (KENALOG) 0.1 % external cream        ALLERGIES:  Allergies   Allergen Reactions    Pseudoephedrine Other (See Comments)     Other Reaction(s): Tachycardia    Tetracycline Hives    Adhesive Tape Rash     Echocardiogram stickers    Tylenol [Acetaminophen] Rash     All over chest       FAMILY HISTORY:  No family history on file.    SOCIAL HISTORY:   Social History     Socioeconomic History    Marital status:      Social Drivers of Health     Financial Resource Strain: Low Risk  (11/1/2024)    Financial Resource Strain     Within the past 12 months, have you or your family members you live with been unable to get utilities  "(heat, electricity) when it was really needed?: No   Food Insecurity: No Food Insecurity (11/7/2024)    Received from [facility]    Food Insecurity     Do you worry your food will run out before you are able to buy more?: 1   Transportation Needs: No Transportation Needs (11/7/2024)    Received from [facility] [facility]    Transportation Needs     Does lack of transportation keep you from medical appointments?: 1     Does lack of transportation keep you from work, meetings or getting things that you need?: 1   Social Connections: Socially Integrated (11/7/2024)    Received from [facility] [employer] [facility]    Social Connections     Do you often feel lonely or isolated from those around you?: 0   Interpersonal Safety: Low Risk  (11/1/2024)    Interpersonal Safety     Do you feel physically and emotionally safe where you currently live?: Yes     Within the past 12 months, have you been hit, slapped, kicked or otherwise physically hurt by someone?: No     Within the past 12 months, have you been humiliated or emotionally abused in other ways by your partner or ex-partner?: No   Housing Stability: Low Risk  (11/7/2024)    [facility]    Housing Stability     What is your housing situation today?: 1       VITALS:  BP (!) 144/78   Pulse 72   Temp 97.7  F (36.5  C) (Oral)   Resp 19   Ht 1.727 m (5' 8\")   Wt 92.5 kg (204 lb)   SpO2 97%   BMI 31.02 kg/m      PHYSICAL EXAM    General presentation: Alert, Vital signs reviewed. NAD. Uncomfortable appearing.   HENT: ENT inspection is normal. Oropharynx is moist and clear.   Eye: Pupils are equal and reactive to light. EOMI  Neck: The neck is supple, with full ROM, with no evidence of meningismus.  Pulmonary: Currently in no acute respiratory distress. Normal, non labored respirations, the lung sounds are normal with good equal air movement. Clear to " auscultation bilaterally.   Circulatory: Regular rate and rhythm. Peripheral pulses are strong and equal. No murmurs, rubs, or gallops.   Abdominal: The abdomen is soft. Nontender. No rigidity, guarding, or rebound. Bowel sounds normal.   Neurologic: Alert, oriented to person, place, and time. No motor deficit. No sensory deficit. Cranial nerves II through XII are intact.  Musculoskeletal: No extremity tenderness. Full range of motion in all extremities. No extremity edema.   Skin: Skin color is normal. No rash. Warm. Dry to touch.     LAB:  All pertinent labs reviewed and interpreted.  Results for orders placed or performed during the hospital encounter of 11/24/24   Chest XR,  PA & LAT    Impression    IMPRESSION: Negative chest.   Result Value Ref Range    INR 1.12 0.85 - 1.15   Partial thromboplastin time   Result Value Ref Range    aPTT 27 22 - 38 Seconds   Comprehensive metabolic panel   Result Value Ref Range    Sodium 141 135 - 145 mmol/L    Potassium 3.6 3.4 - 5.3 mmol/L    Carbon Dioxide (CO2) 23 22 - 29 mmol/L    Anion Gap 14 7 - 15 mmol/L    Urea Nitrogen 9.8 8.0 - 23.0 mg/dL    Creatinine 0.90 0.51 - 0.95 mg/dL    GFR Estimate 70 >60 mL/min/1.73m2    Calcium 9.5 8.8 - 10.4 mg/dL    Chloride 104 98 - 107 mmol/L    Glucose 207 (H) 70 - 99 mg/dL    Alkaline Phosphatase 78 40 - 150 U/L    AST 20 0 - 45 U/L    ALT 25 0 - 50 U/L    Protein Total 7.0 6.4 - 8.3 g/dL    Albumin 4.1 3.5 - 5.2 g/dL    Bilirubin Total 0.4 <=1.2 mg/dL   Result Value Ref Range    Magnesium 1.6 (L) 1.7 - 2.3 mg/dL   Result Value Ref Range    Troponin T, High Sensitivity 15 (H) <=14 ng/L   CBC with platelets and differential   Result Value Ref Range    WBC Count 7.2 4.0 - 11.0 10e3/uL    RBC Count 4.72 3.80 - 5.20 10e6/uL    Hemoglobin 13.3 11.7 - 15.7 g/dL    Hematocrit 40.8 35.0 - 47.0 %    MCV 86 78 - 100 fL    MCH 28.2 26.5 - 33.0 pg    MCHC 32.6 31.5 - 36.5 g/dL    RDW 14.4 10.0 - 15.0 %    Platelet Count 167 150 - 450 10e3/uL     % Neutrophils 66 %    % Lymphocytes 25 %    % Monocytes 7 %    % Eosinophils 2 %    % Basophils 0 %    % Immature Granulocytes 0 %    NRBCs per 100 WBC 0 <1 /100    Absolute Neutrophils 4.8 1.6 - 8.3 10e3/uL    Absolute Lymphocytes 1.8 0.8 - 5.3 10e3/uL    Absolute Monocytes 0.5 0.0 - 1.3 10e3/uL    Absolute Eosinophils 0.1 0.0 - 0.7 10e3/uL    Absolute Basophils 0.0 0.0 - 0.2 10e3/uL    Absolute Immature Granulocytes 0.0 <=0.4 10e3/uL    Absolute NRBCs 0.0 10e3/uL   Extra Red Top Tube   Result Value Ref Range    Hold Specimen JIC    Result Value Ref Range    CRP Inflammation 16.50 (H) <5.00 mg/L   Erythrocyte sedimentation rate auto   Result Value Ref Range    Erythrocyte Sedimentation Rate 15 0 - 30 mm/hr   Nt probnp inpatient   Result Value Ref Range    N terminal Pro BNP Inpatient 394 0 - 900 pg/mL   D dimer quantitative   Result Value Ref Range    D-Dimer Quantitative <=0.27 0.00 - 0.50 ug/mL FEU   ECG 12-LEAD WITH MUSE ([facility])   Result Value Ref Range    Systolic Blood Pressure  mmHg    Diastolic Blood Pressure  mmHg    Ventricular Rate 128 BPM    Atrial Rate 340 BPM    CA Interval  ms    QRS Duration 76 ms     ms    QTc 400 ms    P Axis  degrees    R AXIS 38 degrees    T Axis 193 degrees    Interpretation ECG       Atrial fibrillation with rapid ventricular response with premature ventricular or aberrantly conducted complexes  T wave abnormality, consider inferolateral ischemia  Abnormal ECG  When compared with ECG of 01-Nov-2024 12:18,  T wave inversion now evident in Inferior leads  T wave inversion now evident in Anterolateral leads  Confirmed by SEE ED PROVIDER NOTE FOR, ECG INTERPRETATION ([id number]),  Shobha Mathew ([id number]) on 11/24/2024 2:59:43 PM         RADIOLOGY:  Reviewed all pertinent imaging. Please see official radiology report.  Chest XR,  PA & LAT   Final Result   IMPRESSION: Negative chest.          EKG:    Atrial fibrillation with rapid ventricular response.  Rate of 128.  Normal QRS.   Normal QT.  Occasional PVCs noted.  Nonspecific ST and T wave changes noted.  Compared to the EKG on 11/1/2024 the PVCs and ST and T wave changes appear new.    I have independently reviewed and interpreted the EKG(s) documented above.        I, Leonel Teresa , am serving as a scribe to document services personally performed by Maggie Juárez DO based on my observation and the provider's statements to me. I, Maggie Juárez, attest that Leonel Teresa is acting in a scribe capacity, has observed my performance of the services and has documented them in accordance with my direction.    Maggie Juárez DO  Emergency Medicine  [facility] EMERGENCY ROOM  [address] [address] [phone]       Maggie Juárez DO  11/24/24 0927

## 2024-11-24 NOTE — ED TRIAGE NOTES
The patient presents to the ED with c/o right anterior chest pain that radiates to her left side of her chest since last night. She also feels short of breath. Hx of pericarditis. Hx of A-fib s/p ablation.     Triage Assessment (Adult)       Row Name 11/24/24 1454          Triage Assessment    Airway WDL WDL        Respiratory WDL    Respiratory WDL X;rhythm/pattern     Rhythm/Pattern, Respiratory shortness of breath        Skin Circulation/Temperature WDL    Skin Circulation/Temperature WDL WDL        Cardiac WDL    Cardiac WDL X;chest pain        Peripheral/Neurovascular WDL    Peripheral Neurovascular WDL WDL        Cognitive/Neuro/Behavioral WDL    Cognitive/Neuro/Behavioral WDL WDL

## 2024-11-24 NOTE — DISCHARGE INSTRUCTIONS
Your symptoms appear to be due to atrial fibrillation with rapid ventricular response.  Other than your low magnesium level your laboratory tests were all reassuring.  It is possible that your low magnesium levels are a contributing factor to your atrial fibrillation episodes.      Continue taking your home medications as directed.  Please follow-up with your cardiologist for reevaluation.  Return back to ED sooner for any repeat episodes of atrial fibrillation, chest pain or pressure, shortness breath, dizziness, or any other new or concerning symptoms.

## 2024-11-28 ENCOUNTER — APPOINTMENT (OUTPATIENT)
Dept: CT IMAGING | Facility: CLINIC | Age: 69
DRG: 315 | End: 2024-11-28
Attending: EMERGENCY MEDICINE
Payer: COMMERCIAL

## 2024-11-28 ENCOUNTER — HOSPITAL ENCOUNTER (INPATIENT)
Facility: CLINIC | Age: 69
DRG: 315 | End: 2024-11-28
Attending: EMERGENCY MEDICINE | Admitting: STUDENT IN AN ORGANIZED HEALTH CARE EDUCATION/TRAINING PROGRAM
Payer: COMMERCIAL

## 2024-11-28 ENCOUNTER — APPOINTMENT (OUTPATIENT)
Dept: RADIOLOGY | Facility: CLINIC | Age: 69
DRG: 315 | End: 2024-11-28
Attending: EMERGENCY MEDICINE
Payer: COMMERCIAL

## 2024-11-28 VITALS
HEIGHT: 68 IN | HEART RATE: 70 BPM | TEMPERATURE: 98.1 F | BODY MASS INDEX: 30.92 KG/M2 | WEIGHT: 204 LBS | SYSTOLIC BLOOD PRESSURE: 95 MMHG | OXYGEN SATURATION: 97 % | RESPIRATION RATE: 16 BRPM | DIASTOLIC BLOOD PRESSURE: 57 MMHG

## 2024-11-28 DIAGNOSIS — I30.0 RECURRENT IDIOPATHIC PERICARDITIS: ICD-10-CM

## 2024-11-28 DIAGNOSIS — I48.0 PAROXYSMAL ATRIAL FIBRILLATION (H): ICD-10-CM

## 2024-11-28 DIAGNOSIS — I30.0 ACUTE IDIOPATHIC PERICARDITIS: ICD-10-CM

## 2024-11-28 LAB
ALBUMIN SERPL BCG-MCNC: 3.6 G/DL (ref 3.5–5.2)
ALP SERPL-CCNC: 75 U/L (ref 40–150)
ALT SERPL W P-5'-P-CCNC: 26 U/L (ref 0–50)
ANION GAP SERPL CALCULATED.3IONS-SCNC: 14 MMOL/L (ref 7–15)
APTT PPP: 26 SECONDS (ref 22–38)
AST SERPL W P-5'-P-CCNC: 30 U/L (ref 0–45)
ATRIAL RATE - MUSE: 72 BPM
BASOPHILS # BLD AUTO: 0 10E3/UL (ref 0–0.2)
BASOPHILS NFR BLD AUTO: 0 %
BILIRUB SERPL-MCNC: 0.6 MG/DL
BUN SERPL-MCNC: 17.6 MG/DL (ref 8–23)
CALCIUM SERPL-MCNC: 9.3 MG/DL (ref 8.8–10.4)
CHLORIDE SERPL-SCNC: 102 MMOL/L (ref 98–107)
CREAT SERPL-MCNC: 0.92 MG/DL (ref 0.51–0.95)
CRP SERPL-MCNC: 97.2 MG/L
D DIMER PPP FEU-MCNC: 2.2 UG/ML FEU (ref 0–0.5)
DIASTOLIC BLOOD PRESSURE - MUSE: NORMAL MMHG
EGFRCR SERPLBLD CKD-EPI 2021: 68 ML/MIN/1.73M2
EOSINOPHIL # BLD AUTO: 0.1 10E3/UL (ref 0–0.7)
EOSINOPHIL NFR BLD AUTO: 1 %
ERYTHROCYTE [DISTWIDTH] IN BLOOD BY AUTOMATED COUNT: 13.9 % (ref 10–15)
ERYTHROCYTE [SEDIMENTATION RATE] IN BLOOD BY WESTERGREN METHOD: 44 MM/HR (ref 0–30)
GLUCOSE SERPL-MCNC: 160 MG/DL (ref 70–99)
HCO3 SERPL-SCNC: 21 MMOL/L (ref 22–29)
HCT VFR BLD AUTO: 38.7 % (ref 35–47)
HGB BLD-MCNC: 12.8 G/DL (ref 11.7–15.7)
HOLD SPECIMEN: NORMAL
IMM GRANULOCYTES # BLD: 0 10E3/UL
IMM GRANULOCYTES NFR BLD: 0 %
INR PPP: 1.35 (ref 0.85–1.15)
INTERPRETATION ECG - MUSE: NORMAL
LIPASE SERPL-CCNC: 31 U/L (ref 13–60)
LYMPHOCYTES # BLD AUTO: 1.8 10E3/UL (ref 0.8–5.3)
LYMPHOCYTES NFR BLD AUTO: 18 %
MAGNESIUM SERPL-MCNC: 1.9 MG/DL (ref 1.7–2.3)
MCH RBC QN AUTO: 28.3 PG (ref 26.5–33)
MCHC RBC AUTO-ENTMCNC: 33.1 G/DL (ref 31.5–36.5)
MCV RBC AUTO: 85 FL (ref 78–100)
MONOCYTES # BLD AUTO: 0.7 10E3/UL (ref 0–1.3)
MONOCYTES NFR BLD AUTO: 6 %
NEUTROPHILS # BLD AUTO: 7.7 10E3/UL (ref 1.6–8.3)
NEUTROPHILS NFR BLD AUTO: 75 %
NRBC # BLD AUTO: 0 10E3/UL
NRBC BLD AUTO-RTO: 0 /100
NT-PROBNP SERPL-MCNC: 804 PG/ML (ref 0–900)
P AXIS - MUSE: 52 DEGREES
PLATELET # BLD AUTO: 219 10E3/UL (ref 150–450)
POTASSIUM SERPL-SCNC: 4.3 MMOL/L (ref 3.4–5.3)
PR INTERVAL - MUSE: 148 MS
PROT SERPL-MCNC: 6.8 G/DL (ref 6.4–8.3)
QRS DURATION - MUSE: 78 MS
QT - MUSE: 428 MS
QTC - MUSE: 468 MS
R AXIS - MUSE: 33 DEGREES
RBC # BLD AUTO: 4.53 10E6/UL (ref 3.8–5.2)
SODIUM SERPL-SCNC: 137 MMOL/L (ref 135–145)
SYSTOLIC BLOOD PRESSURE - MUSE: NORMAL MMHG
T AXIS - MUSE: 117 DEGREES
TROPONIN T SERPL HS-MCNC: 17 NG/L
TROPONIN T SERPL HS-MCNC: 19 NG/L
TSH SERPL DL<=0.005 MIU/L-ACNC: 3.68 UIU/ML (ref 0.3–4.2)
VENTRICULAR RATE- MUSE: 72 BPM
WBC # BLD AUTO: 10.3 10E3/UL (ref 4–11)

## 2024-11-28 PROCEDURE — 85610 PROTHROMBIN TIME: CPT | Performed by: EMERGENCY MEDICINE

## 2024-11-28 PROCEDURE — 250N000011 HC RX IP 250 OP 636: Performed by: EMERGENCY MEDICINE

## 2024-11-28 PROCEDURE — 93005 ELECTROCARDIOGRAM TRACING: CPT | Performed by: EMERGENCY MEDICINE

## 2024-11-28 PROCEDURE — 250N000013 HC RX MED GY IP 250 OP 250 PS 637: Performed by: STUDENT IN AN ORGANIZED HEALTH CARE EDUCATION/TRAINING PROGRAM

## 2024-11-28 PROCEDURE — 99285 EMERGENCY DEPT VISIT HI MDM: CPT | Mod: 25

## 2024-11-28 PROCEDURE — 99223 1ST HOSP IP/OBS HIGH 75: CPT | Performed by: STUDENT IN AN ORGANIZED HEALTH CARE EDUCATION/TRAINING PROGRAM

## 2024-11-28 PROCEDURE — 84484 ASSAY OF TROPONIN QUANT: CPT | Performed by: EMERGENCY MEDICINE

## 2024-11-28 PROCEDURE — 80053 COMPREHEN METABOLIC PANEL: CPT | Performed by: EMERGENCY MEDICINE

## 2024-11-28 PROCEDURE — 71275 CT ANGIOGRAPHY CHEST: CPT

## 2024-11-28 PROCEDURE — 84443 ASSAY THYROID STIM HORMONE: CPT | Performed by: STUDENT IN AN ORGANIZED HEALTH CARE EDUCATION/TRAINING PROGRAM

## 2024-11-28 PROCEDURE — 120N000004 HC R&B MS OVERFLOW

## 2024-11-28 PROCEDURE — 36415 COLL VENOUS BLD VENIPUNCTURE: CPT | Performed by: EMERGENCY MEDICINE

## 2024-11-28 PROCEDURE — 83735 ASSAY OF MAGNESIUM: CPT | Performed by: EMERGENCY MEDICINE

## 2024-11-28 PROCEDURE — 85652 RBC SED RATE AUTOMATED: CPT | Performed by: EMERGENCY MEDICINE

## 2024-11-28 PROCEDURE — 83880 ASSAY OF NATRIURETIC PEPTIDE: CPT | Performed by: EMERGENCY MEDICINE

## 2024-11-28 PROCEDURE — 85004 AUTOMATED DIFF WBC COUNT: CPT | Performed by: EMERGENCY MEDICINE

## 2024-11-28 PROCEDURE — 85730 THROMBOPLASTIN TIME PARTIAL: CPT | Performed by: EMERGENCY MEDICINE

## 2024-11-28 PROCEDURE — 83690 ASSAY OF LIPASE: CPT | Performed by: EMERGENCY MEDICINE

## 2024-11-28 PROCEDURE — 85379 FIBRIN DEGRADATION QUANT: CPT | Performed by: EMERGENCY MEDICINE

## 2024-11-28 PROCEDURE — 71045 X-RAY EXAM CHEST 1 VIEW: CPT

## 2024-11-28 PROCEDURE — 86140 C-REACTIVE PROTEIN: CPT | Performed by: EMERGENCY MEDICINE

## 2024-11-28 RX ORDER — AMIODARONE HYDROCHLORIDE 200 MG/1
200 TABLET ORAL DAILY
Status: DISCONTINUED | OUTPATIENT
Start: 2024-11-29 | End: 2024-11-29

## 2024-11-28 RX ORDER — ROSUVASTATIN CALCIUM 5 MG/1
10 TABLET, COATED ORAL AT BEDTIME
Status: DISCONTINUED | OUTPATIENT
Start: 2024-11-28 | End: 2024-11-30 | Stop reason: HOSPADM

## 2024-11-28 RX ORDER — KETOROLAC TROMETHAMINE 15 MG/ML
15 INJECTION, SOLUTION INTRAMUSCULAR; INTRAVENOUS ONCE
Status: COMPLETED | OUTPATIENT
Start: 2024-11-28 | End: 2024-11-28

## 2024-11-28 RX ORDER — LIDOCAINE 40 MG/G
CREAM TOPICAL
Status: DISCONTINUED | OUTPATIENT
Start: 2024-11-28 | End: 2024-11-30 | Stop reason: HOSPADM

## 2024-11-28 RX ORDER — AMOXICILLIN 250 MG
2 CAPSULE ORAL 2 TIMES DAILY PRN
Status: DISCONTINUED | OUTPATIENT
Start: 2024-11-28 | End: 2024-11-30 | Stop reason: HOSPADM

## 2024-11-28 RX ORDER — NITROGLYCERIN 0.4 MG/1
0.4 TABLET SUBLINGUAL EVERY 5 MIN PRN
Status: DISCONTINUED | OUTPATIENT
Start: 2024-11-28 | End: 2024-11-28

## 2024-11-28 RX ORDER — IOPAMIDOL 755 MG/ML
90 INJECTION, SOLUTION INTRAVASCULAR ONCE
Status: COMPLETED | OUTPATIENT
Start: 2024-11-28 | End: 2024-11-28

## 2024-11-28 RX ORDER — CALCIUM CARBONATE 500 MG/1
1000 TABLET, CHEWABLE ORAL 4 TIMES DAILY PRN
Status: DISCONTINUED | OUTPATIENT
Start: 2024-11-28 | End: 2024-11-30 | Stop reason: HOSPADM

## 2024-11-28 RX ORDER — METOPROLOL TARTRATE 1 MG/ML
5 INJECTION, SOLUTION INTRAVENOUS EVERY 5 MIN PRN
Status: DISCONTINUED | OUTPATIENT
Start: 2024-11-28 | End: 2024-11-30 | Stop reason: HOSPADM

## 2024-11-28 RX ORDER — PANTOPRAZOLE SODIUM 40 MG/1
40 TABLET, DELAYED RELEASE ORAL DAILY
Status: DISCONTINUED | OUTPATIENT
Start: 2024-11-29 | End: 2024-11-30 | Stop reason: HOSPADM

## 2024-11-28 RX ORDER — AMOXICILLIN 250 MG
1 CAPSULE ORAL 2 TIMES DAILY PRN
Status: DISCONTINUED | OUTPATIENT
Start: 2024-11-28 | End: 2024-11-30 | Stop reason: HOSPADM

## 2024-11-28 RX ORDER — METOPROLOL SUCCINATE 25 MG/1
25 TABLET, EXTENDED RELEASE ORAL DAILY
Status: ON HOLD | COMMUNITY
End: 2024-11-30

## 2024-11-28 RX ORDER — METOPROLOL SUCCINATE 25 MG/1
25 TABLET, EXTENDED RELEASE ORAL DAILY
Status: DISCONTINUED | OUTPATIENT
Start: 2024-11-29 | End: 2024-11-29

## 2024-11-28 RX ORDER — AMIODARONE HYDROCHLORIDE 200 MG/1
200 TABLET ORAL DAILY
Status: ON HOLD | COMMUNITY
End: 2024-11-30

## 2024-11-28 RX ORDER — COLCHICINE 0.6 MG
0.3 TABLET ORAL DAILY
Status: DISCONTINUED | OUTPATIENT
Start: 2024-11-29 | End: 2024-11-30 | Stop reason: HOSPADM

## 2024-11-28 RX ORDER — KETOROLAC TROMETHAMINE 15 MG/ML
15 INJECTION, SOLUTION INTRAMUSCULAR; INTRAVENOUS EVERY 8 HOURS
Status: DISCONTINUED | OUTPATIENT
Start: 2024-11-29 | End: 2024-11-29

## 2024-11-28 RX ADMIN — ROSUVASTATIN 10 MG: 5 TABLET, FILM COATED ORAL at 20:21

## 2024-11-28 RX ADMIN — IOPAMIDOL 90 ML: 755 INJECTION, SOLUTION INTRAVENOUS at 15:22

## 2024-11-28 RX ADMIN — APIXABAN 5 MG: 5 TABLET, FILM COATED ORAL at 20:19

## 2024-11-28 RX ADMIN — KETOROLAC TROMETHAMINE 15 MG: 15 INJECTION, SOLUTION INTRAMUSCULAR; INTRAVENOUS at 18:30

## 2024-11-28 ASSESSMENT — ACTIVITIES OF DAILY LIVING (ADL)
ADLS_ACUITY_SCORE: 18
ADLS_ACUITY_SCORE: 41
FALL_HISTORY_WITHIN_LAST_SIX_MONTHS: NO
ADLS_ACUITY_SCORE: 18
ADLS_ACUITY_SCORE: 41
DOING_ERRANDS_INDEPENDENTLY_DIFFICULTY: NO
DRESSING/BATHING_DIFFICULTY: NO
ADLS_ACUITY_SCORE: 18
ADLS_ACUITY_SCORE: 41
TOILETING_ISSUES: NO
WALKING_OR_CLIMBING_STAIRS_DIFFICULTY: NO
ADLS_ACUITY_SCORE: 18
ADLS_ACUITY_SCORE: 41
DIFFICULTY_COMMUNICATING: NO
ADLS_ACUITY_SCORE: 41
CONCENTRATING,_REMEMBERING_OR_MAKING_DECISIONS_DIFFICULTY: NO
WEAR_GLASSES_OR_BLIND: YES
HEARING_DIFFICULTY_OR_DEAF: NO
DIFFICULTY_EATING/SWALLOWING: NO
CHANGE_IN_FUNCTIONAL_STATUS_SINCE_ONSET_OF_CURRENT_ILLNESS/INJURY: NO

## 2024-11-28 ASSESSMENT — COLUMBIA-SUICIDE SEVERITY RATING SCALE - C-SSRS
2. HAVE YOU ACTUALLY HAD ANY THOUGHTS OF KILLING YOURSELF IN THE PAST MONTH?: NO
1. IN THE PAST MONTH, HAVE YOU WISHED YOU WERE DEAD OR WISHED YOU COULD GO TO SLEEP AND NOT WAKE UP?: NO
6. HAVE YOU EVER DONE ANYTHING, STARTED TO DO ANYTHING, OR PREPARED TO DO ANYTHING TO END YOUR LIFE?: NO

## 2024-11-28 NOTE — ED NOTES
Patient frequently switching between a-fib 120-130s and NSR 70s. Dr. Juárez aware and states last time she was here cardiac rhythm was similarly changing.

## 2024-11-28 NOTE — ED PROVIDER NOTES
EMERGENCY DEPARTMENT ENCOUNTER      NAME: Carri Hauser  AGE: 69 year old female  YOB: 1955  MRN: [id number]  EVALUATION DATE & TIME: No admission date for patient encounter.    PCP: Ruba Mendez    ED PROVIDER: Maggie Juárez DO      Chief Complaint   Patient presents with    Chest Pain    Shortness of Breath         FINAL IMPRESSION:  1. Recurrent idiopathic pericarditis    2. Paroxysmal atrial fibrillation (H)          ED COURSE & MEDICAL DECISION MAKIN-year-old female with a history of paroxysmal atrial fibrillation, GERD, hypertension, and idiopathic pericarditis presented to the ED for evaluation of chest pain and shortness of breath that began last evening.  The patient also stated that she went into atrial fibrillation last evening at the start of her symptoms.  However, the patient noted that the A-fib resolved but the chest pain and shortness of breath remained.  Of note, I saw the patient for similar symptoms 4 days ago in this ED.  Symptoms at that time were related to her atrial fibrillation.  The patient's symptoms resolved completely once the H fibrillation resolved which is a different than today.  Upon arrival to the ED the patient was hemodynamically stable.  She did not appear to be in any obvious distress or discomfort at the time of her initial evaluation.  The patient's physical exam was unremarkable.    An EKG was obtained which revealed normal sinus rhythm with T wave inversions noted in the inferior, anterior, and lateral leads.      CBC, BMP, hepatic panel, lipase, and BNP were all reassuring.  The patient's initial troponin was 19.  Her D-dimer was elevated at 2.2.     CT scan of the chest again shows pericarditis with a small pericardial effusion.  Patient also was noted to have mild pulmonary edema bilaterally.      Additional laboratory tests including ESR and CRP were obtained.  Patient's CRP increased from 16 4 days ago to 97.  ESR increased from 15 to 44  during that same period of time.  He is findings appear consistent with pericarditis.    The patient's case was then discussed with the on-call cardiologist Dr. Alegria who recommended giving the patient IV Toradol and observing her overnight.      The patient was reevaluated and informed of the lab and imaging results as well as the cardiologist recommendations.  The patient agreed with the plan for admission.  The patient's case was then discussed with the admitting hospitalist Dr. Sorensen.       Pertinent Labs & Imaging studies reviewed. (See chart for details)  2:25 PM I met with the patient to gather history and to perform my initial exam. We discussed plans for the ED course, including diagnostic testing and treatment.   6:28 PM I spoke with hospitalist Dr. Sorensen.      At the conclusion of the encounter I discussed the results of all of the tests and the disposition. The questions were answered. The patient or family acknowledged understanding and was agreeable with the care plan.     Medical Decision Making    History:  Supplemental history from: Documented in chart and Family Member/Significant Other  External Record(s) reviewed: Documented in chart    Work Up:  Chart documentation includes differential considered and any EKGs or imaging independently interpreted by provider, where specified.  In additional to work up documented, I considered the following work up: Documented in chart, if applicable.    External consultation:  Discussion of management with another provider: Documented in chart, if applicable    Complicating factors:  Care impacted by chronic illness: Heart Disease, Hyperlipidemia, and Hypertension  Care affected by social determinants of health: N/A    Disposition considerations: Admit.      PPE worn: n95 mask, goggles    MEDICATIONS GIVEN IN THE EMERGENCY:  Medications   lidocaine 1 % 0.1-1 mL (has no administration in time range)   lidocaine (LMX4) cream (has no administration in time  range)   sodium chloride (PF) 0.9% PF flush 3 mL (3 mLs Intracatheter $Given 11/28/24 2019)   sodium chloride (PF) 0.9% PF flush 3 mL (has no administration in time range)   senna-docusate (SENOKOT-S/PERICOLACE) 8.6-50 MG per tablet 1 tablet (has no administration in time range)     Or   senna-docusate (SENOKOT-S/PERICOLACE) 8.6-50 MG per tablet 2 tablet (has no administration in time range)   calcium carbonate (TUMS) chewable tablet 1,000 mg (has no administration in time range)   Patient is already receiving anticoagulation with heparin, enoxaparin (LOVENOX), warfarin (COUMADIN)  or other anticoagulant medication (has no administration in time range)   ketorolac (TORADOL) injection 15 mg (has no administration in time range)   metoprolol (LOPRESSOR) injection 5 mg (has no administration in time range)   apixaban ANTICOAGULANT (ELIQUIS) tablet 5 mg (5 mg Oral $Given 11/28/24 2019)   colchicine half-tab 0.3 mg (has no administration in time range)   amiodarone (PACERONE) tablet 200 mg (has no administration in time range)   metoprolol succinate ER (TOPROL XL) 24 hr tablet 25 mg (has no administration in time range)   pantoprazole (PROTONIX) EC tablet 40 mg (has no administration in time range)   rosuvastatin (CRESTOR) tablet 10 mg (10 mg Oral $Given 11/28/24 2021)   iopamidol (ISOVUE-370) solution 90 mL (90 mLs Intravenous $Given 11/28/24 1522)   ketorolac (TORADOL) injection 15 mg (15 mg Intravenous $Given 11/28/24 1830)       NEW PRESCRIPTIONS STARTED AT TODAY'S ER VISIT  Current Discharge Medication List             =================================================================    HPI    Patient information was obtained from: Patient    Use of : N/A         Carri Hauser is a 69 year old female with a pertinent history of atrial fibrillation, chest pain, dysphasia, hyperlipidemia, hypertension who presents to this ED via private car for evaluation of chest pain and shortness of breath.     Patient  "states she left the hospital with relieved symptoms, but symptoms have returned. Patient states she had Atrial fibrillation beginning at 9 PM last night into this morning. Patient states she feels out of it. Patient states she has chest pain that feels \"like someone reached in and squeezed her heart hard and fast\". Patient states she has felt light headed and felt shaky the night before last, and states she would write something down but could not read what was written. Patient states she had an echocardiogram done and was noted to have mild plaque buildup. Patient states she has had a loss of appetite. Of note, Patient started taking Metoprolol 25mg 2x daily and magnesium.     Per chart review:  On 11/24/2024 Patient was seen at [facility] ED for  chest pain and atrial fibrillation. Labs and imaging were unremarkable. Patient was informed symptoms were due to atrial fibrillation. Patient was discharged in stable condition after education on atrial fibrillation and RVR. Patient was given magnesium and instructed to follow up with her cardiologist, or return to the ED for worsened symptoms.      REVIEW OF SYSTEMS   Review of Systems     PAST MEDICAL HISTORY:  No past medical history on file.    PAST SURGICAL HISTORY:  No past surgical history on file.        CURRENT MEDICATIONS:    No current outpatient medications on file.      ALLERGIES:  Allergies   Allergen Reactions    Pseudoephedrine Other (See Comments)     Other Reaction(s): Tachycardia    Tetracycline Hives    Adhesive Tape Rash     Echocardiogram stickers    Tylenol [Acetaminophen] Rash     All over chest       FAMILY HISTORY:  No family history on file.    SOCIAL HISTORY:   Social History     Socioeconomic History    Marital status:      Social Drivers of Health     Financial Resource Strain: Low Risk  (11/28/2024)    Financial Resource Strain     Within the past 12 months, have you or your family members you live with been unable to get utilities " "(heat, electricity) when it was really needed?: No   Food Insecurity: Low Risk  (11/28/2024)    Food Insecurity     Within the past 12 months, did you worry that your food would run out before you got money to buy more?: No     Within the past 12 months, did the food you bought just not last and you didn t have money to get more?: No   Transportation Needs: Low Risk  (11/28/2024)    Transportation Needs     Within the past 12 months, has lack of transportation kept you from medical appointments, getting your medicines, non-medical meetings or appointments, work, or from getting things that you need?: No   Social Connections: Socially Integrated (11/7/2024)    Received from [facility]    Social Connections     Do you often feel lonely or isolated from those around you?: 0   Interpersonal Safety: Low Risk  (11/28/2024)    Interpersonal Safety     Do you feel physically and emotionally safe where you currently live?: Yes     Within the past 12 months, have you been hit, slapped, kicked or otherwise physically hurt by someone?: No     Within the past 12 months, have you been humiliated or emotionally abused in other ways by your partner or ex-partner?: No   Housing Stability: Low Risk  (11/28/2024)    Housing Stability     Do you have housing? : Yes     Are you worried about losing your housing?: No       VITALS:  /76 (BP Location: Right arm)   Pulse 72   Temp 97.8  F (36.6  C) (Oral)   Resp 16   Ht 1.727 m (5' 8\")   Wt 92.5 kg (204 lb)   SpO2 95%   BMI 31.02 kg/m      PHYSICAL EXAM    General presentation: Alert, Vital signs reviewed. NAD  HENT: ENT inspection is normal. Oropharynx is moist and clear.   Eye: Pupils are equal and reactive to light. EOMI  Neck: The neck is supple, with full ROM, with no evidence of meningismus.  Pulmonary: Currently in no acute respiratory distress. Normal, non labored respirations, the lung sounds are normal with good equal air movement. " Clear to auscultation bilaterally.   Circulatory: Regular rate and rhythm. Peripheral pulses are strong and equal. No murmurs, rubs, or gallops.   Abdominal: The abdomen is soft. Nontender. No rigidity, guarding, or rebound. Bowel sounds normal.   Neurologic: Alert, oriented to person, place, and time. No motor deficit. No sensory deficit. Cranial nerves II through XII are intact.  Musculoskeletal: No extremity tenderness. Full range of motion in all extremities. No extremity edema.   Skin: Skin color is normal. No rash. Warm. Dry to touch.      LAB:  All pertinent labs reviewed and interpreted.  Results for orders placed or performed during the hospital encounter of 11/28/24   XR Chest Port 1 View    Impression    IMPRESSION: Small bilateral pleural effusions. No pneumothorax. Cardiac silhouette within normal limits. Mildly tortuous aorta. No acute osseous abnormality.   CT Chest Pulmonary Embolism w Contrast    Impression    IMPRESSION:  1.  Findings suggestive of pericarditis with small pericardial effusion.  2.  Likely mild pulmonary edema with small bilateral pleural effusions, left larger than right.  3.  No pulmonary embolus.  4.  No significant change in 1.0 cm lingular pulmonary nodule, follow-up as previously described.   Extra Blue Top Tube   Result Value Ref Range    Hold Specimen JIC    Extra Green Top (Lithium Heparin) Tube   Result Value Ref Range    Hold Specimen JIC    Extra Purple Top Tube   Result Value Ref Range    Hold Specimen JIC    Result Value Ref Range    INR 1.35 (H) 0.85 - 1.15   Partial thromboplastin time   Result Value Ref Range    aPTT 26 22 - 38 Seconds   Comprehensive metabolic panel   Result Value Ref Range    Sodium 137 135 - 145 mmol/L    Potassium 4.3 3.4 - 5.3 mmol/L    Carbon Dioxide (CO2) 21 (L) 22 - 29 mmol/L    Anion Gap 14 7 - 15 mmol/L    Urea Nitrogen 17.6 8.0 - 23.0 mg/dL    Creatinine 0.92 0.51 - 0.95 mg/dL    GFR Estimate 68 >60 mL/min/1.73m2    Calcium 9.3 8.8 - 10.4  mg/dL    Chloride 102 98 - 107 mmol/L    Glucose 160 (H) 70 - 99 mg/dL    Alkaline Phosphatase 75 40 - 150 U/L    AST 30 0 - 45 U/L    ALT 26 0 - 50 U/L    Protein Total 6.8 6.4 - 8.3 g/dL    Albumin 3.6 3.5 - 5.2 g/dL    Bilirubin Total 0.6 <=1.2 mg/dL   Result Value Ref Range    Magnesium 1.9 1.7 - 2.3 mg/dL   Result Value Ref Range    Troponin T, High Sensitivity 19 (H) <=14 ng/L   CBC with platelets and differential   Result Value Ref Range    WBC Count 10.3 4.0 - 11.0 10e3/uL    RBC Count 4.53 3.80 - 5.20 10e6/uL    Hemoglobin 12.8 11.7 - 15.7 g/dL    Hematocrit 38.7 35.0 - 47.0 %    MCV 85 78 - 100 fL    MCH 28.3 26.5 - 33.0 pg    MCHC 33.1 31.5 - 36.5 g/dL    RDW 13.9 10.0 - 15.0 %    Platelet Count 219 150 - 450 10e3/uL    % Neutrophils 75 %    % Lymphocytes 18 %    % Monocytes 6 %    % Eosinophils 1 %    % Basophils 0 %    % Immature Granulocytes 0 %    NRBCs per 100 WBC 0 <1 /100    Absolute Neutrophils 7.7 1.6 - 8.3 10e3/uL    Absolute Lymphocytes 1.8 0.8 - 5.3 10e3/uL    Absolute Monocytes 0.7 0.0 - 1.3 10e3/uL    Absolute Eosinophils 0.1 0.0 - 0.7 10e3/uL    Absolute Basophils 0.0 0.0 - 0.2 10e3/uL    Absolute Immature Granulocytes 0.0 <=0.4 10e3/uL    Absolute NRBCs 0.0 10e3/uL   Result Value Ref Range    Troponin T, High Sensitivity 17 (H) <=14 ng/L   Result Value Ref Range    Lipase 31 13 - 60 U/L   D dimer quantitative   Result Value Ref Range    D-Dimer Quantitative 2.20 (H) 0.00 - 0.50 ug/mL FEU   Nt probnp inpatient   Result Value Ref Range    N terminal Pro BNP Inpatient 804 0 - 900 pg/mL   Result Value Ref Range    CRP Inflammation 97.20 (H) <5.00 mg/L   Erythrocyte sedimentation rate auto   Result Value Ref Range    Erythrocyte Sedimentation Rate 44 (H) 0 - 30 mm/hr   TSH with free T4 reflex   Result Value Ref Range    TSH 3.68 0.30 - 4.20 uIU/mL   ECG 12-LEAD WITH MUSE (LHE)   Result Value Ref Range    Systolic Blood Pressure  mmHg    Diastolic Blood Pressure  mmHg    Ventricular Rate 72 BPM     Atrial Rate 72 BPM    WV Interval 148 ms    QRS Duration 78 ms     ms    QTc 468 ms    P Axis 52 degrees    R AXIS 33 degrees    T Axis 117 degrees    Interpretation ECG       Sinus rhythm  T wave abnormality, consider anterolateral ischemia  Abnormal ECG  When compared with ECG of 24-Nov-2024 14:56,  Sinus rhythm has replaced Atrial fibrillation  Vent. rate has decreased by  56 bpm  T wave inversion more evident in Anterior leads  Confirmed by SEE ED PROVIDER NOTE FOR, ECG INTERPRETATION ([id number]),  Julia Hernandez ([id number]) on 11/28/2024 3:01:10 PM         RADIOLOGY:  Reviewed all pertinent imaging. Please see official radiology report.  CT Chest Pulmonary Embolism w Contrast   Final Result   IMPRESSION:   1.  Findings suggestive of pericarditis with small pericardial effusion.   2.  Likely mild pulmonary edema with small bilateral pleural effusions, left larger than right.   3.  No pulmonary embolus.   4.  No significant change in 1.0 cm lingular pulmonary nodule, follow-up as previously described.      XR Chest Port 1 View   Final Result   IMPRESSION: Small bilateral pleural effusions. No pneumothorax. Cardiac silhouette within normal limits. Mildly tortuous aorta. No acute osseous abnormality.      Echocardiogram Complete    (Results Pending)       EKG:    Normal sinus rhythm.  Rate of 72.  Normal QRS.  Normal QT.  T wave inversions noted in the inferior, anterior, and lateral leads.  Compared to EKG on 11/24/2024 normal sinus rhythm has replaced atrial fibrillation with rapid ventricular sponsor.  T wave inversions are unchanged.    I have independently reviewed and interpreted the EKG(s) documented above.      I, George Landeros , am serving as a scribe to document services personally performed by Maggie Juárez, DO based on my observation and the provider's statements to me. I, Maggie Juárez, attest that George Landeros is acting in a scribe capacity, has observed my performance of the services and has  documented them in accordance with my direction.    Maggie Juárez DO  Emergency Medicine  [facility] EMERGENCY ROOM  [address] [facility] [address] [phone]       Maggie Juárez DO  11/28/24 8877

## 2024-11-28 NOTE — ED TRIAGE NOTES
Pt presents to the ED with c/o CP and SOB that started last night. Pt endorses left sided CP that radiates into left shoulder and arm. Pt reports having an echo done on Monday. Was started on new meds through her cardiologist.      Triage Assessment (Adult)       Row Name 11/28/24 1257          Triage Assessment    Airway WDL WDL        Respiratory WDL    Respiratory WDL X;rhythm/pattern     Rhythm/Pattern, Respiratory shortness of breath        Skin Circulation/Temperature WDL    Skin Circulation/Temperature WDL WDL        Cardiac WDL    Cardiac WDL X;chest pain        Chest Pain Assessment    Chest Pain Location anterior chest, left     Chest Pain Radiation shoulder;back     Character sharp;stabbing     Precipitating Factors nothing        Peripheral/Neurovascular WDL    Peripheral Neurovascular WDL WDL        Cognitive/Neuro/Behavioral WDL    Cognitive/Neuro/Behavioral WDL WDL

## 2024-11-29 ENCOUNTER — APPOINTMENT (OUTPATIENT)
Dept: CARDIOLOGY | Facility: CLINIC | Age: 69
DRG: 315 | End: 2024-11-29
Attending: STUDENT IN AN ORGANIZED HEALTH CARE EDUCATION/TRAINING PROGRAM
Payer: COMMERCIAL

## 2024-11-29 LAB
ANION GAP SERPL CALCULATED.3IONS-SCNC: 10 MMOL/L (ref 7–15)
ATRIAL RATE - MUSE: 74 BPM
BUN SERPL-MCNC: 17.4 MG/DL (ref 8–23)
CALCIUM SERPL-MCNC: 8.6 MG/DL (ref 8.8–10.4)
CHLORIDE SERPL-SCNC: 105 MMOL/L (ref 98–107)
CREAT SERPL-MCNC: 0.9 MG/DL (ref 0.51–0.95)
CRP SERPL-MCNC: 82.9 MG/L
DIASTOLIC BLOOD PRESSURE - MUSE: NORMAL MMHG
EGFRCR SERPLBLD CKD-EPI 2021: 70 ML/MIN/1.73M2
GLUCOSE SERPL-MCNC: 140 MG/DL (ref 70–99)
HCO3 SERPL-SCNC: 20 MMOL/L (ref 22–29)
INTERPRETATION ECG - MUSE: NORMAL
LVEF ECHO: NORMAL
MAGNESIUM SERPL-MCNC: 1.8 MG/DL (ref 1.7–2.3)
P AXIS - MUSE: 48 DEGREES
POTASSIUM SERPL-SCNC: 4.3 MMOL/L (ref 3.4–5.3)
PR INTERVAL - MUSE: 152 MS
QRS DURATION - MUSE: 72 MS
QT - MUSE: 422 MS
QTC - MUSE: 468 MS
R AXIS - MUSE: 15 DEGREES
SODIUM SERPL-SCNC: 135 MMOL/L (ref 135–145)
SYSTOLIC BLOOD PRESSURE - MUSE: NORMAL MMHG
T AXIS - MUSE: 132 DEGREES
VENTRICULAR RATE- MUSE: 74 BPM

## 2024-11-29 PROCEDURE — 120N000004 HC R&B MS OVERFLOW

## 2024-11-29 PROCEDURE — 86140 C-REACTIVE PROTEIN: CPT | Performed by: STUDENT IN AN ORGANIZED HEALTH CARE EDUCATION/TRAINING PROGRAM

## 2024-11-29 PROCEDURE — 36415 COLL VENOUS BLD VENIPUNCTURE: CPT | Performed by: STUDENT IN AN ORGANIZED HEALTH CARE EDUCATION/TRAINING PROGRAM

## 2024-11-29 PROCEDURE — 93010 ELECTROCARDIOGRAM REPORT: CPT | Mod: RTG | Performed by: INTERNAL MEDICINE

## 2024-11-29 PROCEDURE — 93306 TTE W/DOPPLER COMPLETE: CPT | Mod: 26 | Performed by: INTERNAL MEDICINE

## 2024-11-29 PROCEDURE — 999N000208 ECHOCARDIOGRAM COMPLETE

## 2024-11-29 PROCEDURE — 99233 SBSQ HOSP IP/OBS HIGH 50: CPT

## 2024-11-29 PROCEDURE — 80048 BASIC METABOLIC PNL TOTAL CA: CPT | Performed by: STUDENT IN AN ORGANIZED HEALTH CARE EDUCATION/TRAINING PROGRAM

## 2024-11-29 PROCEDURE — 255N000002 HC RX 255 OP 636: Performed by: STUDENT IN AN ORGANIZED HEALTH CARE EDUCATION/TRAINING PROGRAM

## 2024-11-29 PROCEDURE — 250N000012 HC RX MED GY IP 250 OP 636 PS 637: Performed by: INTERNAL MEDICINE

## 2024-11-29 PROCEDURE — 93005 ELECTROCARDIOGRAM TRACING: CPT

## 2024-11-29 PROCEDURE — 250N000011 HC RX IP 250 OP 636: Performed by: STUDENT IN AN ORGANIZED HEALTH CARE EDUCATION/TRAINING PROGRAM

## 2024-11-29 PROCEDURE — 83735 ASSAY OF MAGNESIUM: CPT | Performed by: STUDENT IN AN ORGANIZED HEALTH CARE EDUCATION/TRAINING PROGRAM

## 2024-11-29 PROCEDURE — 250N000013 HC RX MED GY IP 250 OP 250 PS 637: Performed by: STUDENT IN AN ORGANIZED HEALTH CARE EDUCATION/TRAINING PROGRAM

## 2024-11-29 PROCEDURE — 250N000013 HC RX MED GY IP 250 OP 250 PS 637

## 2024-11-29 PROCEDURE — 99222 1ST HOSP IP/OBS MODERATE 55: CPT | Performed by: INTERNAL MEDICINE

## 2024-11-29 PROCEDURE — C8929 TTE W OR WO FOL WCON,DOPPLER: HCPCS

## 2024-11-29 RX ORDER — PREDNISONE 20 MG/1
40 TABLET ORAL DAILY
Status: DISCONTINUED | OUTPATIENT
Start: 2024-11-29 | End: 2024-11-30 | Stop reason: HOSPADM

## 2024-11-29 RX ORDER — IBUPROFEN 400 MG/1
400 TABLET, FILM COATED ORAL 3 TIMES DAILY
Status: DISCONTINUED | OUTPATIENT
Start: 2024-11-29 | End: 2024-11-30

## 2024-11-29 RX ORDER — AMIODARONE HYDROCHLORIDE 200 MG/1
200 TABLET ORAL 2 TIMES DAILY
Status: DISCONTINUED | OUTPATIENT
Start: 2024-11-29 | End: 2024-11-29

## 2024-11-29 RX ADMIN — APIXABAN 5 MG: 5 TABLET, FILM COATED ORAL at 20:27

## 2024-11-29 RX ADMIN — ROSUVASTATIN 10 MG: 5 TABLET, FILM COATED ORAL at 20:28

## 2024-11-29 RX ADMIN — METOPROLOL SUCCINATE ER TABLETS 25 MG: 25 TABLET, FILM COATED, EXTENDED RELEASE ORAL at 09:43

## 2024-11-29 RX ADMIN — AMIODARONE HYDROCHLORIDE 200 MG: 200 TABLET ORAL at 09:42

## 2024-11-29 RX ADMIN — KETOROLAC TROMETHAMINE 15 MG: 15 INJECTION, SOLUTION INTRAMUSCULAR; INTRAVENOUS at 02:22

## 2024-11-29 RX ADMIN — PREDNISONE 40 MG: 20 TABLET ORAL at 09:43

## 2024-11-29 RX ADMIN — APIXABAN 5 MG: 5 TABLET, FILM COATED ORAL at 09:42

## 2024-11-29 RX ADMIN — PERFLUTREN 2 ML: 6.52 INJECTION, SUSPENSION INTRAVENOUS at 09:16

## 2024-11-29 RX ADMIN — PANTOPRAZOLE SODIUM 40 MG: 40 TABLET, DELAYED RELEASE ORAL at 09:42

## 2024-11-29 RX ADMIN — COLCHICINE 0.3 MG: 0.6 TABLET ORAL at 09:42

## 2024-11-29 ASSESSMENT — ACTIVITIES OF DAILY LIVING (ADL)
ADLS_ACUITY_SCORE: 27
ADLS_ACUITY_SCORE: 18
ADLS_ACUITY_SCORE: 27
ADLS_ACUITY_SCORE: 18
ADLS_ACUITY_SCORE: 27
ADLS_ACUITY_SCORE: 27
ADLS_ACUITY_SCORE: 18
ADLS_ACUITY_SCORE: 27
ADLS_ACUITY_SCORE: 18
ADLS_ACUITY_SCORE: 27

## 2024-11-29 NOTE — PLAN OF CARE
Goal Outcome Evaluation:      Plan of Care Reviewed With: patient    Overall Patient Progress: improvingOverall Patient Progress: improving    Outcome Evaluation: A&O x 4. Denies chest pain. IND in the room. On a cardiac diet. Calm and Cooperative. Flip in and out of Afib on tele. VSS on RA. Denies SOB. Call light within reach HOB elevated.         Problem: Adult Inpatient Plan of Care  Goal: Absence of Hospital-Acquired Illness or Injury  Intervention: Prevent Infection  Recent Flowsheet Documentation  Taken 11/28/2024 2002 by Jeniffer Bowen, RN  Infection Prevention:   hand hygiene promoted   single patient room provided        Problem: Chest Pain  Goal: Resolution of Chest Pain Symptoms  Outcome: Improved    Problem: Gas Exchange Impaired  Goal: Optimal Gas Exchange  Outcome: Progressing

## 2024-11-29 NOTE — PLAN OF CARE
Goal Outcome Evaluation:         Report received. Pt came up to the floor. VSS on RA. Denies chest pain. IS SR on tele

## 2024-11-29 NOTE — CONSULTS
HEART CARE CONSULTATON NOTE        Assessment/Recommendations   Assessment:  1.  Recurrent relapsing idiopathic pericarditis: Likely in part due to steroid use unfortunately this was used due to recurrent atrial fibrillation and need for anticoagulation.  Will readminister steroids and discussed with patient that may need to consider an IL-1 inhibitor.  Cardiac MRI done in October showed evidence of positive LGE and may be responsive to an IL-1.  Would not taper steroids until her CRP has improved.  2.  Anticoagulation: CHADS2 Vascor of 3 for age, gender, hypertension on Eliquis  3.  Atrial fibrillation: Paroxysmal with recurrent episodes on a daily basis that have been symptomatic.  Previous history of ablation.  If she has recurrent A-fib despite treatment of her pericarditis may need to consider a repeat PVI  4.  Nonobstructive coronary artery disease    Plan:  1.  Discussed with pharmacy and difficult determining how much colchicine she is getting with concurrent administration of amiodarone.  Will stop amiodarone and metoprolol.  Plan on starting low-dose sotalol tomorrow.  2.  Continue colchicine at the 0.3 mg dose for a few more days and then may try uptitrating to 0.6 mg daily for a goal course of 3 months.  3.  Unable to give a long course of NSAIDs with anticoagulation.  Will resume a course of steroids.  Longer course of steroids with a taper.  Ultimately if we have difficulty with recurrent pericarditis again may need to consider an IL-1 inhibitor.  Reviewed this with the pharmacy and may be cost prohibitive.  4.  She has follow-up in the [facility] with Dr. Trevioz  Clinically Significant Risk Factors Present on Admission           # Hypocalcemia: Lowest Ca = 8.6 mg/dL in last 2 days, will monitor and replace as appropriate      # Drug Induced Coagulation Defect: home medication list includes an anticoagulant medication      # Hypertension: Noted on problem list                 # Obesity: Estimated  "body mass index is 31.47 kg/m  as calculated from the following:    Height as of this encounter: 1.727 m (5' 8\").    Weight as of this encounter: 93.9 kg (207 lb).        # Moderate Malnutrition: based on nutrition assessment                  History of Present Illness/Subjective    HPI: Carri Hauser is a 69 year old female with history of atrial fibrillation status post ablation in 2016, hypertension, dyslipidemia, nonobstructive coronary disease on recent CT coronary angiogram who has been suffering from recurrent pericarditis over the past year and paroxysmal atrial fibrillation.  She first had an episode of chest pain back in February 2024.  She had another episode of chest pain again in July.  Not diagnosed with pericarditis until she presented in September with a more severe episode of chest pain.  She was given a course of NSAIDs/colchicine for relief.  She had some improvement however it then recurred and she also presented with atrial fibrillation.  During that next hospitalization she was started on Eliquis, amiodarone, low-dose metoprolol and steroid with a taper.  Once steroids were tapered she had recurrent chest pain.  She is also noted recurrent episodes of atrial fibrillation occurring on almost a daily basis.  Last Sunday she came into the ED with an episode of atrial fibrillation.  She came back to the ED again yesterday and now with more severe chest pain described as a stabbing pain worse with a deep breath that radiates around to her back.  CT was negative for PE.  CRP elevated to 97, 2 months ago it was as low as 6.  Given IV Toradol with some relief of her discomfort.  Currently her pain is much improved and she only feels it mildly with a deep breath.      Limited echocardiogram 11/25/2024  1.  Normal left ventricular chamber size and systolic function; visually estimated ejection   fraction 60-65%.    2.  No focal regional wall motion abnormalities.    3.  Normal right ventricular chamber " size and systolic function; unable to estimate right   ventricular systolic pressure.    4.  Moderate left atrial enlargement.    5.  No significant valvular disease (limited interrogation).     CT coronary angiogram 11/15/2024  CT CORONARY ANGIOGRAM, CALCIUM SCORE, 11/15/2024     INDICATION: Chest pain.     CONCLUSIONS:   This is a dual read study - please review [facility] over-read   below for incidental non cardiac findings.   Moderate stenosis involving a calcified plaque in the ostium of the second   diagonal branch with luminal stenosis of 50-69%.   Scattered mild eccentric calcific plaque in the proximal LAD and   circumflex vessels without obstructive characteristics.   Calcium score is 328, placing the patient in the 91st percentile compared   with age and gender-matched controls.   CT FFR is performed to evaluate physiologic significance of coronary   artery stenoses: No hemodynamically significant stenoses noted:       Cardiac MRI 10/24/2024  -Cardiac MRI findings consistent with subacute pericarditis with no evidence of myocarditis. There is mild   thickening of the pericardium 3-4 mm over the right ventricle and anterior left ventricle with associated   late gadolinium enhancement.  On T2 weighted imaging, there is mild hyperintensity of the pericardium   overlying the basal right ventricle consistent with edema/inflammation.  There is prominent epicardial fat.    There is no pericardial free fluid. There is no evidence of pericardial constriction.   -The left ventricle is normal in size with normal systolic function. Quantitative LVEF 65 %.  There are no   regional wall motion abnormalities.   -The right ventricle is normal in size with normal systolic function. Quantitative RVEF 65 %.   -There is no replacement fibrosis or prior myocardial infarction.   -The left atrium is moderately dilated.   -There is no significant valvular heart disease.        Physical Examination  Review of Systems  "  VITALS: /66   Pulse 74   Temp 97.9  F (36.6  C) (Oral)   Resp 18   Ht 1.727 m (5' 8\")   Wt 93.9 kg (207 lb)   SpO2 98%   BMI 31.47 kg/m    BMI: Body mass index is 31.47 kg/m .  Wt Readings from Last 3 Encounters:   11/29/24 93.9 kg (207 lb)   11/24/24 92.5 kg (204 lb)   11/02/24 93.1 kg (205 lb 4.8 oz)       Intake/Output Summary (Last 24 hours) at 11/29/2024 1106  Last data filed at 11/29/2024 0800  Gross per 24 hour   Intake 486 ml   Output --   Net 486 ml     General Appearance:   no distress, normal body habitus   ENT/Mouth: membranes moist, no oral lesions or bleeding gums.      EYES:  no scleral icterus, normal conjunctivae   Neck: no carotid bruits or thyromegaly   Chest/Lungs:   lungs are clear to auscultation   Cardiovascular:   Regular. Normal first and second heart sounds with no murmur no edema bilaterally    Abdomen:  bowel sounds are present   Extremities: no cyanosis or clubbing   Skin: no xanthelasma, warm.    Neurologic: normal  bilateral, no tremors     Psychiatric: alert and oriented x3, calm     Review Of Systems  Skin: negative  Eyes: negative  Ears/Nose/Throat: negative  Respiratory: No shortness of breath, dyspnea on exertion, cough, or hemoptysis  Cardiovascular: negative  Gastrointestinal: negative  Genitourinary: negative  Musculoskeletal: negative  Neurologic: negative  Psychiatric: negative  Hematologic/Lymphatic/Immunologic: negative  Endocrine: negative          Lab Results    Chemistry/lipid CBC Cardiac Enzymes/BNP/TSH/INR   No results for input(s): \"CHOL\", \"HDL\", \"LDL\", \"TRIG\", \"CHOLHDLRATIO\" in the last 31651 hours.  No results for input(s): \"LDL\" in the last 28432 hours.  Recent Labs   Lab Test 11/29/24  0547      POTASSIUM 4.3   CHLORIDE 105   CO2 20*   *   BUN 17.4   CR 0.90   GFRESTIMATED 70   SHRUTHI 8.6*     Recent Labs   Lab Test 11/29/24  0547 11/28/24  1315 11/24/24  1510   CR 0.90 0.92 0.90     No results for input(s): \"A1C\" in the last 72000 " "hours.       Recent Labs   Lab Test 11/28/24  1315   WBC 10.3   HGB 12.8   HCT 38.7   MCV 85        Recent Labs   Lab Test 11/28/24  1315 11/24/24  1510 11/01/24  1033   HGB 12.8 13.3 12.7    No results for input(s): \"TROPONINI\" in the last 89841 hours.  Recent Labs   Lab Test 11/28/24  1315 11/24/24  1510 11/01/24  1033   NTBNPI 804 394 1,161*     Recent Labs   Lab Test 11/28/24  1539   TSH 3.68     Recent Labs   Lab Test 11/28/24  1315 11/24/24  1510   INR 1.35* 1.12        Medical History  Surgical History Family History Social History   No past medical history on file.  No past surgical history on file.  No family history on file.     Social History     Socioeconomic History    Marital status:      Spouse name: Not on file    Number of children: Not on file    Years of education: Not on file    Highest education level: Not on file   Occupational History    Not on file   Tobacco Use    Smoking status: Not on file    Smokeless tobacco: Not on file   Substance and Sexual Activity    Alcohol use: Not on file    Drug use: Not on file    Sexual activity: Not on file   Other Topics Concern    Not on file   Social History Narrative    Not on file     Social Drivers of Health     Financial Resource Strain: Low Risk  (11/28/2024)    Financial Resource Strain     Within the past 12 months, have you or your family members you live with been unable to get utilities (heat, electricity) when it was really needed?: No   Food Insecurity: Low Risk  (11/28/2024)    Food Insecurity     Within the past 12 months, did you worry that your food would run out before you got money to buy more?: No     Within the past 12 months, did the food you bought just not last and you didn t have money to get more?: No   Transportation Needs: Low Risk  (11/28/2024)    Transportation Needs     Within the past 12 months, has lack of transportation kept you from medical appointments, getting your medicines, non-medical meetings or " appointments, work, or from getting things that you need?: No   Physical Activity: Not on file   Stress: Not on file   Social Connections: Socially Integrated (11/7/2024)    [facility] from [facility]    Social Connections     Do you often feel lonely or isolated from those around you?: 0   Interpersonal Safety: Low Risk  (11/28/2024)    Interpersonal Safety     Do you feel physically and emotionally safe where you currently live?: Yes     Within the past 12 months, have you been hit, slapped, kicked or otherwise physically hurt by someone?: No     Within the past 12 months, have you been humiliated or emotionally abused in other ways by your partner or ex-partner?: No   Housing Stability: Low Risk  (11/28/2024)    Housing Stability     Do you have housing? : Yes     Are you worried about losing your housing?: No         Medications  Allergies   No current outpatient medications on file.        Allergies   Allergen Reactions    Pseudoephedrine Other (See Comments)     Other Reaction(s): Tachycardia    Tetracycline Hives    Adhesive Tape Rash     Echocardiogram stickers    Tylenol [Acetaminophen] Rash     All over chest         Iqra Alegria MD

## 2024-11-29 NOTE — PHARMACY-ADMISSION MEDICATION HISTORY
Pharmacist Admission Medication History    Admission medication history is complete. The information provided in this note is only as accurate as the sources available at the time of the update.    Information Source(s): Patient and CareEverywhere/SureScripts via in-person    Pertinent Information: New rx in SureScripts for metoprolol tartrate 25 mg BID (from cardiologist 11/27) - patient was not aware of this and states she is still taking metoprolol succinate 25 mg daily.    Changes made to PTA medication list:  Added: metoprolol  Deleted: prednisone  Changed: None    Allergies reviewed with patient and updates made in EHR: yes    Medication History Completed By: Alo Akhtar RPH 11/28/2024 6:44 PM    PTA Med List   Medication Sig Note Last Dose/Taking    amiodarone (PACERONE) 200 MG tablet Take 200 mg by mouth daily. 11/28/2024: 27 day supply started 11/2/24 11/28/2024 Morning    apixaban ANTICOAGULANT (ELIQUIS) 5 MG tablet Take 1 tablet (5 mg) by mouth 2 times daily.  11/28/2024 Morning    colchicine (COLCRYS) 0.6 MG tablet Take 0.5 tablets (0.3 mg) by mouth daily.  11/28/2024 Morning    lisinopril (ZESTRIL) 20 MG tablet Take 20 mg by mouth at bedtime.  11/27/2024 Bedtime    metoprolol succinate ER (TOPROL XL) 25 MG 24 hr tablet Take 25 mg by mouth daily.  11/28/2024 Morning    omeprazole (PRILOSEC) 20 MG DR capsule Take 20 mg by mouth daily.  11/28/2024 Morning    rosuvastatin (CRESTOR) 10 MG tablet Take 10 mg by mouth at bedtime.  11/27/2024 Bedtime    triamcinolone (KENALOG) 0.1 % external cream Apply topically 3 times daily as needed for irritation.  Past Week

## 2024-11-29 NOTE — CONSULTS
11/29 test claim for Kineret- medication is not covered on plan and will require a prior auth if needed for outpatient/future. Cash cost $7,500 for 30 days supply.   Thank you for allowing me to help with your patient  Mariela Tam [facility]  Pharmacy Discharge Liaison   [facility]

## 2024-11-29 NOTE — H&P
[facility]    History and Physical - Hospitalist Service       Date of Admission:  11/28/2024    Assessment & Plan      Carri Hauser is a 69 year old female admitted on 11/28/2024. She has a past medical history significant for hypertension, hyperlipidemia, coronary artery disease, paroxysmal atrial fibrillation status post ablation in 2016 recurrent idiopathic pericarditis with recurrent hospitalization over the last 2 months who returned to the hospital due to heart palpitation and chest pain.  She was diagnosed with recurrent idiopathic pericarditis.    Recurrent idiopathic pericarditis  Patient was first diagnosed with pericarditis in September 2024.  She failed a course of ibuprofen and colchicine.  Recurrent pericarditis on 11/1/2024. Completed a course of tapering dose of prednisone for 30 days.  Currently on colchicine 0.3 mg daily  Symptoms are consistent with pericarditis.  CT chest showed signs of pericarditis along with small pericardial effusion.  Mild pulmonary edema.  CRP elevated.  EKG with T wave inversion in the anterior lateral leads possibly reflecting recent pericarditis.  Troponin mildly elevated at 19 and peaked.  Per ED physician--> spoke to cardiology--> recommended admitting the patient and starting Toradol.  They will evaluate the patient on 11/29.    Plan  Start Toradol 15 mg every 8 hours.  Discussed with pharmacy.  Unclear why cardiology decided to use Toradol instead of other more common NSAIDs to treat pericarditis such as ibuprofen.  Continue home colchicine 0.3 mg daily (patient on lower dose as she is on amiodarone)  Continue home omeprazole 20 mg daily.  At increased risk for bleeding and peptic ulcer disease.  Check TSH  Recheck CRP on 11/29  Transthoracic echocardiogram--had an echocardiogram on 11/25, however, at that time the patient's symptoms was controlled and she did not have A-fib with RVR.  Also it appears that her pericardial effusion is  "possibly new.  Consult cardiology  Consider other medication such as interleukin-1 receptor antagonist, Anakinra    Paroxysmal atrial fibrillation with RVR and tachy-zak physiology.  Patient is currently in A-fib with RVR.  Heart rate around 120.  At home on Eliquis, metoprolol succinate 25 mg daily.    Plan  Keep patient on telemetry  Continue home Eliquis  Continue home metoprolol succinate  Continue home amiodarone  Metoprolol titrate IV pushes for heart rate above 110.    Hypertension  At home on lisinopril 20 mg nightly  Blood pressures currently around 110/70    Plan  Hold home lisinopril for tonight    Hyperlipidemia    Plan  Continue home rosuvastatin     Rosacea                   Diet: Regular Diet Adult    DVT Prophylaxis: DOAC  Vill[place]ana Catheter: Not present  Lines: None     Cardiac Monitoring: None  Code Status:  Full code    Clinically Significant Risk Factors Present on Admission                # Drug Induced Coagulation Defect: home medication list includes an anticoagulant medication    # Hypertension: Noted on problem list           # Obesity: Estimated body mass index is 31.02 kg/m  as calculated from the following:    Height as of this encounter: 1.727 m (5' 8\").    Weight as of this encounter: 92.5 kg (204 lb).              Disposition Plan     Medically Ready for Discharge: Anticipated Tomorrow           JEAN-PAUL CRONIN MD  Hospitalist Service  [facility]  Securely message with [employer] (more info)  Text page via [employer] Paging/Directory     ______________________________________________________________________    Chief Complaint   Heart palpitation and chest pain    History is obtained from the patient    History of Present Illness   Carri Hauser is a 69 year old female admitted on 11/28/2024. She has a past medical history significant for hypertension, hyperlipidemia, coronary artery disease, paroxysmal atrial fibrillation status post ablation in 2016 recurrent idiopathic " "pericarditis with recurrent hospitalization over the last 2 months who returned to the hospital due to heart palpitation and chest pain.    Patient was first diagnosed with pericarditis in September 2024.  She was discharged home with a course of ibuprofen and colchicine.  Her symptoms initially improved, however, it reoccurred after a few weeks.  She was hospitalized on 11/1/2024 for subacute pericarditis and A-fib with RVR.  Therefore, she was discharged home on 30 days of tapering dose prednisone along with colchicine on lower dose as she was started on amiodarone.    She completed the course of prednisone.  On 11/27/2024 she noticed her palpitation \"my heart is jumping out on my chest\" substernal sharp chest pain, pleuritic chest pain, mild shortness of breath, mild lightheadedness.  Denies any loss of consciousness.  Denies any fever or chills.  Denies any abdominal pain.    Vitals on presentation: Blood pressure around 110/70, heart rate fluctuating between A-fib with RVR and normal sinus rhythm.  SpO2 around 95% on room air.  Afebrile.      Past Medical History    No past medical history on file.    Past Surgical History   No past surgical history on file.    Prior to Admission Medications   Prior to Admission Medications   Prescriptions Last Dose Informant Patient Reported? Taking?   amiodarone (PACERONE) 200 MG tablet 11/28/2024 Morning  Yes Yes   Sig: Take 200 mg by mouth daily.   apixaban ANTICOAGULANT (ELIQUIS) 5 MG tablet 11/28/2024 Morning  No Yes   Sig: Take 1 tablet (5 mg) by mouth 2 times daily.   colchicine (COLCRYS) 0.6 MG tablet 11/28/2024 Morning  No Yes   Sig: Take 0.5 tablets (0.3 mg) by mouth daily.   lisinopril (ZESTRIL) 20 MG tablet 11/27/2024 Bedtime  Yes Yes   Sig: Take 20 mg by mouth at bedtime.   metoprolol succinate ER (TOPROL XL) 25 MG 24 hr tablet 11/28/2024 Morning  Yes Yes   Sig: Take 25 mg by mouth daily.   omeprazole (PRILOSEC) 20 MG DR capsule 11/28/2024 Morning  Yes Yes   Sig: " Take 20 mg by mouth daily.   rosuvastatin (CRESTOR) 10 MG tablet 11/27/2024 Bedtime  Yes Yes   Sig: Take 10 mg by mouth at bedtime.   triamcinolone (KENALOG) 0.1 % external cream Past Week  Yes Yes   Sig: Apply topically 3 times daily as needed for irritation.      Facility-Administered Medications: None          Physical Exam   Vital Signs: Temp: 97.3  F (36.3  C) Temp src: Temporal BP: 104/71 Pulse: (!) 130   Resp: 17 SpO2: 96 % O2 Device: None (Room air)    Weight: 204 lbs 0 oz    Physical Exam  Constitutional:       General: She is not in acute distress.     Appearance: She is not toxic-appearing.   Cardiovascular:      Rate and Rhythm: Tachycardia present. Rhythm irregular.      Heart sounds: No murmur heard.  Pulmonary:      Effort: Pulmonary effort is normal. No respiratory distress.      Breath sounds: No wheezing.   Skin:     General: Skin is warm and dry.   Neurological:      Mental Status: She is alert.   Psychiatric:         Mood and Affect: Mood normal.         Thought Content: Thought content normal.         Judgment: Judgment normal.          Medical Decision Making       79 MINUTES SPENT BY ME on the date of service doing chart review, history, exam, documentation & further activities per the note.      Data     I have personally reviewed the following data over the past 24 hrs:    10.3  \   12.8   / 219     137 102 17.6 /  160 (H)   4.3 21 (L) 0.92 \     ALT: 26 AST: 30 AP: 75 TBILI: 0.6   ALB: 3.6 TOT PROTEIN: 6.8 LIPASE: 31     Trop: 17 (H) BNP: 804     Procal: N/A CRP: 97.20 (H) Lactic Acid: N/A       INR:  1.35 (H) PTT:  26   D-dimer:  2.20 (H) Fibrinogen:  N/A       Imaging results reviewed over the past 24 hrs:   Recent Results (from the past 24 hours)   XR Chest Port 1 View    Narrative    EXAM: XR CHEST PORT 1 VIEW  LOCATION: [facility]  DATE: 11/28/2024    INDICATION: Chest Pain  COMPARISON: 11/24/2024      Impression    IMPRESSION: Small bilateral pleural  effusions. No pneumothorax. Cardiac silhouette within normal limits. Mildly tortuous aorta. No acute osseous abnormality.   CT Chest Pulmonary Embolism w Contrast    Narrative    EXAM: CT CHEST PULMONARY EMBOLISM W CONTRAST  LOCATION: [facility]  DATE: 11/28/2024    INDICATION: Chest pain and shortness of breath  COMPARISON: Same day chest radiograph, CT 9/16/2024  TECHNIQUE: CT chest pulmonary angiogram during arterial phase injection of IV contrast. Multiplanar reformats and MIP reconstructions were performed. Dose reduction techniques were used.   CONTRAST: Isovue 370 90ml    FINDINGS:  ANGIOGRAM CHEST: Pulmonary arteries are normal caliber and negative for pulmonary emboli. Thoracic aorta is not well opacified and is indeterminate for dissection. No CT evidence of right heart strain.    LUNGS AND PLEURA: Subtle increased interlobular septal thickening with some associated groundglass opacities. Small bilateral pleural effusions with associated compressive atelectasis, left greater than right. No significant change in size of 1.0 cm of   lingular pulmonary nodule (series 7 image 140).    MEDIASTINUM/AXILLAE: New small pericardial effusion with suggestion of pericardial thickening and inflammation. No suspicious lymphadenopathy by size criteria.    CORONARY ARTERY CALCIFICATION: Moderate/severe.    UPPER ABDOMEN: Unremarkable.    MUSCULOSKELETAL: No acute bony abnormality.      Impression    IMPRESSION:  1.  Findings suggestive of pericarditis with small pericardial effusion.  2.  Likely mild pulmonary edema with small bilateral pleural effusions, left larger than right.  3.  No pulmonary embolus.  4.  No significant change in 1.0 cm lingular pulmonary nodule, follow-up as previously described.

## 2024-11-29 NOTE — PROGRESS NOTES
"CLINICAL NUTRITION SERVICES - ASSESSMENT NOTE     Nutrition Prescription    RECOMMENDATIONS FOR MDs/PROVIDERS TO ORDER:  None    Malnutrition Status:    Moderate malnutrition  In Context of:  Acute illness or injury    Recommendations already ordered by Registered Dietitian (RD):  Encourage po intake    Future/Additional Recommendations:  Monitor po intake     REASON FOR ASSESSMENT  Carri Hauser is a/an 69 year old female assessed by the dietitian for Admission Nutrition Risk Screen for positive: 2-13 lb wt loss and decreased appetite    NUTRITION HISTORY  Dx: recurrent idiopathic pericarditis   PMH: hypertension, hyperlipidemia, coronary artery disease, paroxysmal atrial fibrillation status post ablation in 2016 recurrent idiopathic pericarditis with recurrent hospitalization over the last 2 months who returned to the hospital due to heart palpitation and chest pain   - NKFA    Met with pt in room this morning. Pt reports she's feeling a lot better today, and ate most of her breakfast, which was a big improvement in appetite over the past few weeks. Pt did not have any questions about her low sodium/sat fat diet. Pt declined offer of supplements to help with intake, and thinks she can eat well with normal room service foods.    CURRENT NUTRITION ORDERS  Diet: Low Saturated Fat/2400 mg Sodium, no caffeine  Intake/Tolerance: Pt ordering adequate meals at regular mealtimes per [employer]. 100% intakes noted in flow sheets.    LABS  Labs reviewed    MEDICATIONS  Medications reviewed    PHYSICAL FINDINGS  Edema: 2+ mild in BLE ankles    ANTHROPOMETRICS  Height: 172.7 cm (5' 8\")  Most Recent Weight: 93.9 kg (207 lb)    IBW: 70 kg   BMI: Obesity Grade I BMI 30-34.9  Weight History:   11/29/24 : 93.9 kg (207 lb) - this wt is likely elevated from edema wt  11/24/24 : 92.5 kg (204 lb) - most recent dry wt  11/02/24 : 93.1 kg (205 lb 4.8 oz)   09/16/24 : 97.9 kg (215 lb 14.4 oz)   08/24/23 : 95.7 kg (211 lb)   5.8% wt loss " in 2.5 months    Dosing Weight: 75.6 kg adjusted body wt    ASSESSED NUTRITION NEEDS  Estimated Energy Needs: 5581-1831 kcals/day (25 - 30 kcals/kg)  Justification: Maintenance  Estimated Protein Needs: 60-75 grams protein/day (0.8 - 1 grams of pro/kg)  Justification: Maintenance  Estimated Fluid Needs: 1141-8577 mL/day (1 mL/kcal)   Justification: Maintenance    MALNUTRITION:  % Weight Loss:  7.5% in 3 months (moderate malnutrition)  % Intake:  Decreased intake does not meet criteria for malnutrition   Subcutaneous Fat Loss:  None observed  Muscle Loss:  None observed  Fluid Retention:  Mild     Malnutrition Diagnosis: Moderate malnutrition  In Context of:  Acute illness or injury    NUTRITION DIAGNOSIS  Malnutrition related to poor appetite as evidenced by 7.5% wt loss in 3 months, mild fluid retention      INTERVENTIONS  Implementation  Encourage po intake    Goals  Patient to consume % of nutritionally adequate meals three times per day, or the equivalent with supplements/snacks.     Monitoring/Evaluation  Progress toward goals will be monitored and evaluated per protocol.

## 2024-11-29 NOTE — PLAN OF CARE
Goal Outcome Evaluation:  Pt endorses 0.5/10 aching chest pain, which is reported as significantly improved. Echo completed. Plan to start sotalol tomorrow.     Dolores Cisse RN      Plan of Care Reviewed With: patient    Overall Patient Progress: improvingOverall Patient Progress: improving           Problem: Adult Inpatient Plan of Care  Goal: Plan of Care Review  Description: The Plan of Care Review/Shift note should be completed every shift.  The Outcome Evaluation is a brief statement about your assessment that the patient is improving, declining, or no change.  This information will be displayed automatically on your shift  note.  Outcome: Progressing  Flowsheets (Taken 11/29/2024 1440)  Plan of Care Reviewed With: patient  Overall Patient Progress: improving     Problem: Chest Pain  Goal: Resolution of Chest Pain Symptoms  Outcome: Progressing     Problem: Dysrhythmia  Goal: Normalized Cardiac Rhythm  Outcome: Progressing  Intervention: Monitor and Manage Cardiac Rhythm Effect  Recent Flowsheet Documentation  Taken 11/29/2024 1200 by Dolores Cisse, RN  VTE Prevention/Management: SCDs off (sequential compression devices)  Taken 11/29/2024 0800 by Dolores Cisse RN  VTE Prevention/Management: SCDs off (sequential compression devices)

## 2024-11-29 NOTE — PROGRESS NOTES
[facility]    Medicine Progress Note - Hospitalist Service    Date of Admission:  11/28/2024    Assessment & Plan   Carri Hauser is a 69 year old female admitted on 11/28/2024. She has a past medical history significant for hypertension, hyperlipidemia, coronary artery disease, paroxysmal atrial fibrillation status post ablation in 2016 recurrent idiopathic pericarditis with recurrent hospitalization over the last 2 months who returned to the hospital due to heart palpitation and chest pain.  She was diagnosed with recurrent idiopathic pericarditis.     Recurrent idiopathic pericarditis  Patient was first diagnosed with pericarditis in September 2024.  She failed a course of ibuprofen and colchicine.  Recurrent pericarditis on 11/1/2024. Completed a course of tapering dose of prednisone for 30 days.  Currently on colchicine 0.3 mg daily  Symptoms are consistent with pericarditis.  CT chest showed signs of pericarditis along with small pericardial effusion.  Mild pulmonary edema.  CRP elevated.  EKG with T wave inversion in the anterior lateral leads possibly reflecting recent pericarditis.  Troponin mildly elevated at 19 and peaked.  Per ED physician--> spoke to cardiology--> recommended admitting the patient and starting Toradol.  They will evaluate the patient on 11/29.     Plan  Start Toradol 15 mg every 8 hours.  Discussed with pharmacy.  Unclear why cardiology decided to use Toradol instead of other more common NSAIDs to treat pericarditis such as ibuprofen.  Continue home colchicine 0.3 mg daily (patient on lower dose as she is on amiodarone)  Continue home omeprazole 20 mg daily.  At increased risk for bleeding and peptic ulcer disease.  Check TSH  Recheck CRP on 11/29  Transthoracic echocardiogram--had an echocardiogram on 11/25, however, at that time the patient's symptoms was controlled and she did not have A-fib with RVR.  Also it appears that her pericardial effusion is  "possibly new.  Consult cardiology  Consider other medication such as interleukin-1 receptor antagonist, Anakinra     Paroxysmal atrial fibrillation with RVR and tachy-zak physiology.  Patient is currently in A-fib with RVR.  Heart rate around 120.  At home on Eliquis, metoprolol succinate 25 mg daily.     Plan  Rate control: PTA metoprolol succinate  Rhythm control: ctn PTA amiodarone  Ac: Ctn PTA Eliquis    Hypertension  At home on lisinopril 20 mg nightly  Blood pressures currently around 110/70     Plan  PTA lisinopril for tonight     Hyperlipidemia     Plan  Continue home rosuvastatin         # Hypocalcemia: Lowest Ca = 8.6 mg/dL in last 2 days, will monitor and replace as appropriate      # Drug Induced Coagulation Defect: home medication list includes an anticoagulant medication    # Hypertension: Noted on problem list           # Obesity: Estimated body mass index is 31.47 kg/m  as calculated from the following:    Height as of this encounter: 1.727 m (5' 8\").    Weight as of this encounter: 93.9 kg (207 lb).                    Diet: Combination Diet Low Saturated Fat Na <2400mg Diet, No Caffeine Diet    DVT Prophylaxis: DOAC  Villafana Catheter: Not present  Lines: None     Cardiac Monitoring: ACTIVE order. Indication: Tachyarrhythmias, acute (48 hours)  Code Status: Full Code      Clinically Significant Risk Factors Present on Admission          Social Drivers of Health            Disposition Plan     Medically Ready for Discharge: Anticipated in 2-4 Days             Mel John MD  Hospitalist Service  [facility]  Securely message with [employer] (more info)  Text page via [employer] Paging/Directory   ______________________________________________________________________    Interval History   No acute events overnight, she rates pain 0.5/10. Denies SOB, light headedness, dizziness    Physical Exam   Vital Signs: Temp: 97.9  F (36.6  C) Temp src: Oral BP: 123/84 Pulse: 74   Resp: 18 " SpO2: 98 % O2 Device: None (Room air)    Weight: 207 lbs 0 oz    General Appearance: Not in distress  Respiratory: Clear to auscultation bilaterally, no added breath sounds  Cardiovascular: S1 and S2 well heard, no murmur or gallop  GI: Nontender, nondistended, positive bowel sounds  Skin: No rash  CNS: Alert and oriented x 3, nonfocal      Medical Decision Making       55 MINUTES SPENT BY ME on the date of service doing chart review, history, exam, documentation & further activities per the note.      Data     I have personally reviewed the following data over the past 24 hrs:    10.3  \   12.8   / 219     135 105 17.4 /  140 (H)   4.3 20 (L) 0.90 \     ALT: 26 AST: 30 AP: 75 TBILI: 0.6   ALB: 3.6 TOT PROTEIN: 6.8 LIPASE: 31     Trop: 17 (H) BNP: 804     TSH: 3.68 T4: N/A A1C: N/A     Procal: N/A CRP: 82.90 (H) Lactic Acid: N/A       INR:  1.35 (H) PTT:  26   D-dimer:  2.20 (H) Fibrinogen:  N/A       Imaging results reviewed over the past 24 hrs:   Recent Results (from the past 24 hours)   XR Chest Port 1 View    Narrative    EXAM: XR CHEST PORT 1 VIEW  LOCATION: [facility]  DATE: 11/28/2024    INDICATION: Chest Pain  COMPARISON: 11/24/2024      Impression    IMPRESSION: Small bilateral pleural effusions. No pneumothorax. Cardiac silhouette within normal limits. Mildly tortuous aorta. No acute osseous abnormality.   CT Chest Pulmonary Embolism w Contrast    Narrative    EXAM: CT CHEST PULMONARY EMBOLISM W CONTRAST  LOCATION: [facility]  DATE: 11/28/2024    INDICATION: Chest pain and shortness of breath  COMPARISON: Same day chest radiograph, CT 9/16/2024  TECHNIQUE: CT chest pulmonary angiogram during arterial phase injection of IV contrast. Multiplanar reformats and MIP reconstructions were performed. Dose reduction techniques were used.   CONTRAST: Isovue 370 90ml    FINDINGS:  ANGIOGRAM CHEST: Pulmonary arteries are normal caliber and negative for pulmonary  emboli. Thoracic aorta is not well opacified and is indeterminate for dissection. No CT evidence of right heart strain.    LUNGS AND PLEURA: Subtle increased interlobular septal thickening with some associated groundglass opacities. Small bilateral pleural effusions with associated compressive atelectasis, left greater than right. No significant change in size of 1.0 cm of   lingular pulmonary nodule (series 7 image 140).    MEDIASTINUM/AXILLAE: New small pericardial effusion with suggestion of pericardial thickening and inflammation. No suspicious lymphadenopathy by size criteria.    CORONARY ARTERY CALCIFICATION: Moderate/severe.    UPPER ABDOMEN: Unremarkable.    MUSCULOSKELETAL: No acute bony abnormality.      Impression    IMPRESSION:  1.  Findings suggestive of pericarditis with small pericardial effusion.  2.  Likely mild pulmonary edema with small bilateral pleural effusions, left larger than right.  3.  No pulmonary embolus.  4.  No significant change in 1.0 cm lingular pulmonary nodule, follow-up as previously described.

## 2024-11-30 VITALS
OXYGEN SATURATION: 100 % | TEMPERATURE: 97.9 F | BODY MASS INDEX: 30.65 KG/M2 | RESPIRATION RATE: 16 BRPM | HEART RATE: 69 BPM | WEIGHT: 202.2 LBS | SYSTOLIC BLOOD PRESSURE: 145 MMHG | HEIGHT: 68 IN | DIASTOLIC BLOOD PRESSURE: 80 MMHG

## 2024-11-30 LAB
MAGNESIUM SERPL-MCNC: 2 MG/DL (ref 1.7–2.3)
POTASSIUM SERPL-SCNC: 4.2 MMOL/L (ref 3.4–5.3)

## 2024-11-30 PROCEDURE — 36415 COLL VENOUS BLD VENIPUNCTURE: CPT | Performed by: STUDENT IN AN ORGANIZED HEALTH CARE EDUCATION/TRAINING PROGRAM

## 2024-11-30 PROCEDURE — 99232 SBSQ HOSP IP/OBS MODERATE 35: CPT | Performed by: INTERNAL MEDICINE

## 2024-11-30 PROCEDURE — 84132 ASSAY OF SERUM POTASSIUM: CPT | Performed by: STUDENT IN AN ORGANIZED HEALTH CARE EDUCATION/TRAINING PROGRAM

## 2024-11-30 PROCEDURE — 250N000013 HC RX MED GY IP 250 OP 250 PS 637: Performed by: INTERNAL MEDICINE

## 2024-11-30 PROCEDURE — 250N000013 HC RX MED GY IP 250 OP 250 PS 637: Performed by: STUDENT IN AN ORGANIZED HEALTH CARE EDUCATION/TRAINING PROGRAM

## 2024-11-30 PROCEDURE — 83735 ASSAY OF MAGNESIUM: CPT | Performed by: STUDENT IN AN ORGANIZED HEALTH CARE EDUCATION/TRAINING PROGRAM

## 2024-11-30 PROCEDURE — 93005 ELECTROCARDIOGRAM TRACING: CPT

## 2024-11-30 PROCEDURE — 99239 HOSP IP/OBS DSCHRG MGMT >30: CPT | Performed by: INTERNAL MEDICINE

## 2024-11-30 PROCEDURE — 250N000012 HC RX MED GY IP 250 OP 636 PS 637: Performed by: INTERNAL MEDICINE

## 2024-11-30 RX ORDER — PREDNISONE 20 MG/1
TABLET ORAL
Qty: 20 TABLET | Refills: 0 | Status: SHIPPED | OUTPATIENT
Start: 2024-12-01 | End: 2024-11-30

## 2024-11-30 RX ORDER — COLCHICINE 0.6 MG/1
TABLET ORAL
Qty: 32 TABLET | Refills: 0 | Status: SHIPPED | OUTPATIENT
Start: 2024-11-30 | End: 2025-01-02

## 2024-11-30 RX ORDER — PREDNISONE 20 MG/1
TABLET ORAL
Qty: 20 TABLET | Refills: 0 | Status: SHIPPED | OUTPATIENT
Start: 2024-12-01 | End: 2024-12-17

## 2024-11-30 RX ORDER — SOTALOL HYDROCHLORIDE 80 MG/1
40 TABLET ORAL DAILY
Qty: 30 TABLET | Refills: 0 | Status: SHIPPED | OUTPATIENT
Start: 2024-12-01

## 2024-11-30 RX ADMIN — COLCHICINE 0.3 MG: 0.6 TABLET ORAL at 08:04

## 2024-11-30 RX ADMIN — PREDNISONE 40 MG: 20 TABLET ORAL at 08:04

## 2024-11-30 RX ADMIN — PANTOPRAZOLE SODIUM 40 MG: 40 TABLET, DELAYED RELEASE ORAL at 07:53

## 2024-11-30 RX ADMIN — SOTALOL HYDROCHLORIDE 40 MG: 80 TABLET ORAL at 08:04

## 2024-11-30 RX ADMIN — APIXABAN 5 MG: 5 TABLET, FILM COATED ORAL at 07:54

## 2024-11-30 ASSESSMENT — ACTIVITIES OF DAILY LIVING (ADL)
ADLS_ACUITY_SCORE: 29
ADLS_ACUITY_SCORE: 29
ADLS_ACUITY_SCORE: 27
ADLS_ACUITY_SCORE: 27
ADLS_ACUITY_SCORE: 29
ADLS_ACUITY_SCORE: 27

## 2024-11-30 NOTE — PROGRESS NOTES
HEART CARE CONSULTATON NOTE        Assessment/Recommendations   Assessment:  1.  Recurrent relapsing idiopathic pericarditis: Likely in part due to steroid use unfortunately this was used due to recurrent atrial fibrillation and need for anticoagulation.  Will readminister steroids and discussed with patient that may need to consider an IL-1 inhibitor.  Cardiac MRI done in October showed evidence of positive LGE and may be responsive to an IL-1.  Would not taper steroids until her CRP has improved.  2.  Anticoagulation: CHADS2 Vasc score of 3 for age, gender, hypertension on Eliquis  3.  Atrial fibrillation: Paroxysmal with recurrent episodes on a daily basis that have been symptomatic.  Previous history of ablation.  If she has recurrent A-fib despite treatment of her pericarditis may need to consider a repeat PVI.  She was switched from amiodarone to a very low-dose of sotalol.  Given very low dose due to recent treatment with amiodarone.  Recommend repeat twelve-lead EKG on Monday when she sees her cardiologist.  4.  Nonobstructive coronary artery disease     Plan:  1.  Discussed with pharmacy and difficult determining how much colchicine she is getting with concurrent administration of amiodarone.  Will stop amiodarone and metoprolol.  Started on low-dose sotalol and recommend repeat twelve-lead EKG on Monday.  2.  Continue colchicine at the 0.3 mg dose for a few more days and then may try uptitrating to 0.6 mg daily for a goal course of 3 months.  3.  Unable to give a long course of NSAIDs with anticoagulation.  Will resume a course of steroids.  Longer course of steroids with a taper.  Ultimately if we have difficulty with recurrent pericarditis again may need to consider an IL-1 inhibitor.  Reviewed this with the pharmacy and may be cost prohibitive.  4.  She has follow-up in the [facility] with Dr. Trevizo  May be discharged today.    Clinically Significant Risk Factors           # Hypocalcemia: Lowest  "Ca = 8.6 mg/dL in last 2 days, will monitor and replace as appropriate      # Coagulation Defect: INR = 1.35 (Ref range: 0.85 - 1.15) and/or PTT = 26 Seconds (Ref range: 22 - 38 Seconds), will monitor for bleeding      # Hypertension: Noted on problem list                  # Obesity: Estimated body mass index is 30.74 kg/m  as calculated from the following:    Height as of this encounter: 1.727 m (5' 8\").    Weight as of this encounter: 91.7 kg (202 lb 3.2 oz).  , PRESENT ON ADMISSION    # Moderate Malnutrition: based on nutrition assessment  , PRESENT ON ADMISSION            History of Present Illness/Subjective    Pain has almost completely resolved.  She is feeling much better today.  Twelve-lead EKG done today personally reviewed shows normal sinus rhythm with normal QTc and significant T wave inversions similar to previous.  No concerning findings on telemetry.    Echocardiogram 11/29/2024  Left ventricular size, wall motion and function are normal. The ejection  fraction is 60-65%.  Normal right ventricle size and systolic function.  Trivial pericardial effusion  Pericardial fat pad noted  Right ventricular systolic pressure is elevated, consistent with mild  pulmonary hypertension.  IVC diameter >2.1 cm collapsing <50% with sniff suggests a high RA pressure  estimated at 15 mmHg or greater.  No hemodynamically significant valvular abnormalities on 2D or color flow  imaging.    Limited echocardiogram 11/25/2024  1.  Normal left ventricular chamber size and systolic function; visually estimated ejection   fraction 60-65%.    2.  No focal regional wall motion abnormalities.    3.  Normal right ventricular chamber size and systolic function; unable to estimate right   ventricular systolic pressure.    4.  Moderate left atrial enlargement.    5.  No significant valvular disease (limited interrogation).      CT coronary angiogram 11/15/2024  CT CORONARY ANGIOGRAM, CALCIUM SCORE, 11/15/2024     INDICATION: Chest " "pain.     CONCLUSIONS:   This is a dual read study - please review [facility] over-read   below for incidental non cardiac findings.   Moderate stenosis involving a calcified plaque in the ostium of the second   diagonal branch with luminal stenosis of 50-69%.   Scattered mild eccentric calcific plaque in the proximal LAD and   circumflex vessels without obstructive characteristics.   Calcium score is 328, placing the patient in the 91st percentile compared   with age and gender-matched controls.   CT FFR is performed to evaluate physiologic significance of coronary   artery stenoses: No hemodynamically significant stenoses noted:         Cardiac MRI 10/24/2024  -Cardiac MRI findings consistent with subacute pericarditis with no evidence of myocarditis. There is mild   thickening of the pericardium 3-4 mm over the right ventricle and anterior left ventricle with associated   late gadolinium enhancement.  On T2 weighted imaging, there is mild hyperintensity of the pericardium   overlying the basal right ventricle consistent with edema/inflammation.  There is prominent epicardial fat.    There is no pericardial free fluid. There is no evidence of pericardial constriction.   -The left ventricle is normal in size with normal systolic function. Quantitative LVEF 65 %.  There are no   regional wall motion abnormalities.   -The right ventricle is normal in size with normal systolic function. Quantitative RVEF 65 %.   -There is no replacement fibrosis or prior myocardial infarction.   -The left atrium is moderately dilated.   -There is no significant valvular heart disease.        Physical Examination  Review of Systems   VITALS: BP (!) 145/80 (BP Location: Left arm)   Pulse 69   Temp 97.9  F (36.6  C) (Oral)   Resp 16   Ht 1.727 m (5' 8\")   Wt 91.7 kg (202 lb 3.2 oz)   SpO2 100%   BMI 30.74 kg/m    BMI: Body mass index is 30.74 kg/m .  Wt Readings from Last 3 Encounters:   11/30/24 91.7 kg (202 lb 3.2 oz) " "  11/24/24 92.5 kg (204 lb)   11/02/24 93.1 kg (205 lb 4.8 oz)       Intake/Output Summary (Last 24 hours) at 11/30/2024 [facility]  Last data filed at 11/30/2024 0738  Gross per 24 hour   Intake 1410 ml   Output --   Net 1410 ml     General Appearance:   no distress, normal body habitus   ENT/Mouth: membranes moist, no oral lesions or bleeding gums.      EYES:  no scleral icterus, normal conjunctivae       Chest/Lungs:   lungs are clear to auscultation   Cardiovascular:   Regular. Normal first and second heart sounds with no murmurs no edema bilaterally    Abdomen:  no organomegaly, masses, bruits, or tenderness; bowel sounds are present   Extremities: no cyanosis or clubbing   Skin: no xanthelasma, warm.    Neurologic: normal  bilateral, no tremors     Psychiatric: alert and oriented x3, calm     Review Of Systems  Skin: negative  Eyes: negative  Ears/Nose/Throat: negative  Respiratory: No shortness of breath, dyspnea on exertion, cough, or hemoptysis  Cardiovascular: negative  Gastrointestinal: negative  Genitourinary: negative  Musculoskeletal: negative  Neurologic: negative  Psychiatric: negative  Hematologic/Lymphatic/Immunologic: negative  Endocrine: negative          Lab Results    Chemistry/lipid CBC Cardiac Enzymes/BNP/TSH/INR   No results for input(s): \"CHOL\", \"HDL\", \"LDL\", \"TRIG\", \"CHOLHDLRATIO\" in the last 70599 hours.  No results for input(s): \"LDL\" in the last 49561 hours.  Recent Labs   Lab Test 11/30/24  0557 11/29/24  0547   NA  --  135   POTASSIUM 4.2 4.3   CHLORIDE  --  105   CO2  --  20*   GLC  --  140*   BUN  --  17.4   CR  --  0.90   GFRESTIMATED  --  70   SHRUTHI  --  8.6*     Recent Labs   Lab Test 11/29/24  0547 11/28/24  1315 11/24/24  1510   CR 0.90 0.92 0.90     No results for input(s): \"A1C\" in the last 80489 hours.       Recent Labs   Lab Test 11/28/24  1315   WBC 10.3   HGB 12.8   HCT 38.7   MCV 85        Recent Labs   Lab Test 11/28/24  1315 11/24/24  1510 11/01/24  1033   HGB 12.8 " "13.3 12.7    No results for input(s): \"TROPONINI\" in the last 32175 hours.  Recent Labs   Lab Test 11/28/24  1315 11/24/24  1510 11/01/24  1033   NTBNPI 804 394 1,161*     Recent Labs   Lab Test 11/28/24  1539   TSH 3.68     Recent Labs   Lab Test 11/28/24  1315 11/24/24  1510   INR 1.35* 1.12        Medical History  Surgical History Family History Social History   No past medical history on file.  No past surgical history on file.  No family history on file.     Social History     Socioeconomic History    Marital status:      Spouse name: Not on file    Number of children: Not on file    Years of education: Not on file    Highest education level: Not on file   Occupational History    Not on file   Tobacco Use    Smoking status: Not on file    Smokeless tobacco: Not on file   Substance and Sexual Activity    Alcohol use: Not on file    Drug use: Not on file    Sexual activity: Not on file   Other Topics Concern    Not on file   Social History Narrative    Not on file     Social Drivers of Health     Financial Resource Strain: Low Risk  (11/28/2024)    Financial Resource Strain     Within the past 12 months, have you or your family members you live with been unable to get utilities (heat, electricity) when it was really needed?: No   Food Insecurity: Low Risk  (11/28/2024)    Food Insecurity     Within the past 12 months, did you worry that your food would run out before you got money to buy more?: No     Within the past 12 months, did the food you bought just not last and you didn t have money to get more?: No   Transportation Needs: Low Risk  (11/28/2024)    Transportation Needs     Within the past 12 months, has lack of transportation kept you from medical appointments, getting your medicines, non-medical meetings or appointments, work, or from getting things that you need?: No   Physical Activity: Not on file   Stress: Not on file   Social Connections: Socially Integrated (11/7/2024)    Received from " [facility]    Social Connections     Do you often feel lonely or isolated from those around you?: 0   Interpersonal Safety: Low Risk  (11/28/2024)    Interpersonal Safety     Do you feel physically and emotionally safe where you currently live?: Yes     Within the past 12 months, have you been hit, slapped, kicked or otherwise physically hurt by someone?: No     Within the past 12 months, have you been humiliated or emotionally abused in other ways by your partner or ex-partner?: No   Housing Stability: Low Risk  (11/28/2024)    Housing Stability     Do you have housing? : Yes     Are you worried about losing your housing?: No         Medications  Allergies   No current outpatient medications on file.        Allergies   Allergen Reactions    Pseudoephedrine Other (See Comments)     Other Reaction(s): Tachycardia    Tetracycline Hives    Adhesive Tape Rash     Echocardiogram stickers    Tylenol [Acetaminophen] Rash     All over chest         Iqra Alegria MD

## 2024-11-30 NOTE — DISCHARGE SUMMARY
[facility]    Discharge Summary  Hospitalist    Date of Admission:  11/28/2024  Date of Discharge:  11/30/2024 11:51 AM  Provider:  Maida Gonzales, DO    Discharge Diagnoses    Recurrent, relapsing idiopathic pericarditis   Paroxysmal atrial fib with RVR   Hypocalcemia, mild    Other medical issues:    HTN  hyperlipidemia    History of Present Illness   Carri Hauser is an 69 year old female who presented with heart palpitations and pleuritic chest pain.  Please see the admission history and physical for full details.    Hospital Course   Carri Hauser was admitted on 11/28/2024.  The following problems were addressed during her hospitalization:     Recurrent, relapsing idiopathic pericarditis    Noted to be first diagnosed 9/2024 but has had intermittent chest symptoms since 2/2024.  Recurrence 11/1/2024 despite resolution of symptoms after treatment course of ibuprofen and colchicine.  Completed a recent course of prednisone and was on colchicine taper at time of admission.  CT chest with mild pulmonary edema and small pericardial effusion; negative for PE.  Noted to have elevated CRP, was given IV toradol on admission for symptom control.    Cardiology was consulted.  She was restarted on prednisone with plan for a prolonged taper at time of discharge.   A long course of NSAIDS are limited d/t her being on chronic anticoagulation.  She was continued on colchicine at PTA dose with plan to increase dose to 0.6mg daily over the next few days.      Cardiology discussed possibility of consideration of an IL-1 inhibitor if recurrent pericarditis remains a challenge.      She will have close outpt f/up with primary Cardiologist, Dr. Trevizo, on 12/2/24 with preclinical EKG.    She was chest pain free on am of day of discharge.    2.  Paroxysmal atrial fib with RVR       Chronic anti-coagulation    She presented on amiodarone and metoprolol.  Cardiology discontinued both this medications  and initiated daily, low-dose sotalol on am of day of discharge as there was clinical concern about her not getting adequate amounts of colchicine with concurrent administration of amiodarone.      No changes were made to her PTA eliquis.    As above, she will have close outpt f/up with Cardiology with repeat EKG on 12/2/24.      3.  Hypocalcemia, mild    She was noted to have slightly low calcium level on admission at 8.6.  She will have a repeat BMP in f/up on 12/2/24.     Significant Results and Procedures   none    Pending Results   Unresulted Labs Ordered in the Past 30 Days of this Admission       No orders found from 10/29/2024 to 11/29/2024.            Code Status   Full Code       Primary Care Physician   Ruab Mendez    Physical Exam   Temp: 97.9  F (36.6  C) Temp src: Oral BP: (!) 145/80 Pulse: 69   Resp: 16 SpO2: 100 % O2 Device: None (Room air)    Vitals:    11/28/24 1531 11/29/24 0100 11/30/24 0353   Weight: 92.5 kg (204 lb) 93.9 kg (207 lb) 91.7 kg (202 lb 3.2 oz)     Vital Signs with Ranges  Temp:  [97.9  F (36.6  C)-98.5  F (36.9  C)] 97.9  F (36.6  C)  Pulse:  [69-82] 69  Resp:  [16-18] 16  BP: (123-145)/(70-80) 145/80  SpO2:  [96 %-100 %] 100 %  I/O last 3 completed shifts:  In: 1050 [P.O.:1050]  Out: -     GEN:  Alert, oriented x 3, comfortable, NAD.  smiling  HEENT:  Normocephalic/atraumatic, PERRL, no scleral icterus, no nasal discharge  CV:  Regular rate and rhythm, no murmur to ausc.  S1 + S2 noted,  LUNGS:  Clear to auscultation ant/post bilaterally.  No rales/rhonchi/wheezing auscultated bilaterally.  No costal retractions bilaterally.  Symmetric chest rise on inhalation noted.  EXT:  No significant pretibial edema or cyanosis bilaterally.   PSYCH:  Mood appropriate, Not tearful or depressed.  Maintains direct eye contact.  NEURO:  No tremors at rest, speech is clear and appropriate.    Discharge Disposition   Discharged to home    Consultations This Hospital Stay   CARDIOLOGY IP  CONSULT  PHARMACY LIAISON FOR MEDICATION COVERAGE CONSULT    Time Spent on this Encounter   I, Maida Gonzales DO, personally saw the patient today and spent greater than 30 minutes discharging this patient.    Discharge Orders      Reason for your hospital stay    You were admitted for recurrent pericarditis     Activity    Your activity upon discharge: activity as tolerated     Follow-up and recommended labs and tests     F/up with [facility] on 12/2/24 with a preclinical EKG and BMP   for hospital f/up     Diet    Follow this diet upon discharge: resume home cardiac diet     Discharge Medications   Discharge Medication List as of 11/30/2024 11:28 AM        START taking these medications    Details   sotalol (BETAPACE) 80 MG tablet Take 0.5 tablets (40 mg) by mouth daily., Disp-30 tablet, R-0, E-Prescribe      predniSONE (DELTASONE) 20 MG tablet Take 2 tablets (40 mg) by mouth daily for 4 days, THEN 1.5 tablets (30 mg) daily for 4 days, THEN 1 tablet (20 mg) daily for 4 days, THEN 0.5 tablets (10 mg) daily for 4 days., Disp-20 tablet, R-0, E-Prescribe           CONTINUE these medications which have CHANGED    Details   colchicine (COLCRYS) 0.6 MG tablet Take 0.5 tablets (0.3 mg) by mouth daily for 3 days, THEN 1 tablet (0.6 mg) daily., Disp-32 tablet, R-0, E-Prescribe           CONTINUE these medications which have NOT CHANGED    Details   apixaban ANTICOAGULANT (ELIQUIS) 5 MG tablet Take 1 tablet (5 mg) by mouth 2 times daily., Disp-60 tablet, R-0, E-Prescribe      lisinopril (ZESTRIL) 20 MG tablet Take 20 mg by mouth at bedtime., Historical      omeprazole (PRILOSEC) 20 MG DR capsule Take 20 mg by mouth daily., Historical      rosuvastatin (CRESTOR) 10 MG tablet Take 10 mg by mouth at bedtime., Historical      triamcinolone (KENALOG) 0.1 % external cream Apply topically 3 times daily as needed for irritation.Historical           STOP taking these medications       amiodarone (PACERONE) 200 MG  "tablet Comments:   Reason for Stopping:         metoprolol succinate ER (TOPROL XL) 25 MG 24 hr tablet Comments:   Reason for Stopping:             Allergies   Allergies   Allergen Reactions    Pseudoephedrine Other (See Comments)     Other Reaction(s): Tachycardia    Tetracycline Hives    Adhesive Tape Rash     Echocardiogram stickers    Tylenol [Acetaminophen] Rash     All over chest     Data   Recent Labs   Lab 11/28/24  1315 11/24/24  1510   WBC 10.3 7.2   HGB 12.8 13.3   HCT 38.7 40.8   MCV 85 86    167     Recent Labs   Lab 11/30/24  0557 11/29/24  0547 11/28/24  1315 11/24/24  1510   NA  --  135 137 141   POTASSIUM 4.2 4.3 4.3 3.6   CHLORIDE  --  105 102 104   CO2  --  20* 21* 23   ANIONGAP  --  10 14 14   GLC  --  140* 160* 207*   BUN  --  17.4 17.6 9.8   CR  --  0.90 0.92 0.90   GFRESTIMATED  --  70 68 70   SHRUTHI  --  8.6* 9.3 9.5     Recent Labs   Lab 11/30/24  0557 11/29/24  0547 11/28/24  1315 11/24/24  1510   NA  --  135 137 141   POTASSIUM 4.2 4.3 4.3 3.6   CHLORIDE  --  105 102 104   CO2  --  20* 21* 23   ANIONGAP  --  10 14 14   GLC  --  140* 160* 207*   BUN  --  17.4 17.6 9.8   CR  --  0.90 0.92 0.90   GFRESTIMATED  --  70 68 70   SHRUTHI  --  8.6* 9.3 9.5   MAG 2.0 1.8 1.9 1.6*   PROTTOTAL  --   --  6.8 7.0   ALBUMIN  --   --  3.6 4.1   BILITOTAL  --   --  0.6 0.4   ALKPHOS  --   --  75 78   AST  --   --  30 20   ALT  --   --  26 25     Recent Labs   Lab 11/28/24  1315 11/24/24  1510   [id number]     Recent Labs   Lab 11/28/24  1539   TSH 3.68     No results for input(s): \"COLOR\", \"APPEARANCE\", \"URINEGLC\", \"URINEBILI\", \"URINEKETONE\", \"SG\", \"UBLD\", \"URINEPH\", \"PROTEIN\", \"UROBILINOGEN\", \"NITRITE\", \"LEUKEST\", \"RBCU\", \"WBCU\" in the last 168 hours.  Results for orders placed or performed during the hospital encounter of 11/28/24   XR Chest Port 1 View    Narrative    EXAM: XR CHEST PORT 1 VIEW  LOCATION: [facility]  DATE: 11/28/2024    INDICATION: Chest Pain  COMPARISON: " 11/24/2024      Impression    IMPRESSION: Small bilateral pleural effusions. No pneumothorax. Cardiac silhouette within normal limits. Mildly tortuous aorta. No acute osseous abnormality.   CT Chest Pulmonary Embolism w Contrast    Narrative    EXAM: CT CHEST PULMONARY EMBOLISM W CONTRAST  LOCATION: [facility]  DATE: 11/28/2024    INDICATION: Chest pain and shortness of breath  COMPARISON: Same day chest radiograph, CT 9/16/2024  TECHNIQUE: CT chest pulmonary angiogram during arterial phase injection of IV contrast. Multiplanar reformats and MIP reconstructions were performed. Dose reduction techniques were used.   CONTRAST: Isovue 370 90ml    FINDINGS:  ANGIOGRAM CHEST: Pulmonary arteries are normal caliber and negative for pulmonary emboli. Thoracic aorta is not well opacified and is indeterminate for dissection. No CT evidence of right heart strain.    LUNGS AND PLEURA: Subtle increased interlobular septal thickening with some associated groundglass opacities. Small bilateral pleural effusions with associated compressive atelectasis, left greater than right. No significant change in size of 1.0 cm of   lingular pulmonary nodule (series 7 image 140).    MEDIASTINUM/AXILLAE: New small pericardial effusion with suggestion of pericardial thickening and inflammation. No suspicious lymphadenopathy by size criteria.    CORONARY ARTERY CALCIFICATION: Moderate/severe.    UPPER ABDOMEN: Unremarkable.    MUSCULOSKELETAL: No acute bony abnormality.      Impression    IMPRESSION:  1.  Findings suggestive of pericarditis with small pericardial effusion.  2.  Likely mild pulmonary edema with small bilateral pleural effusions, left larger than right.  3.  No pulmonary embolus.  4.  No significant change in 1.0 cm lingular pulmonary nodule, follow-up as previously described.   Echocardiogram Complete     Value    LVEF  60-65%    Narrative    [id number]  [id number]  [id number]  [id number] [address]     Name: JEFF BOCANEGRA  MRN: [id number]  : 1955  Study Date: 2024 08:45 AM  Age: 69 yrs  Gender: Female  Patient Location: [facility]  Reason For Study: Pericarditis, Pericardial Effusion  Ordering Physician: JEAN-PAUL CRONIN  Performed By: KS     BSA: 2.1 m2  Height: 68 in  Weight: 207 lb  HR: 81  BP: 123/84 mmHg  ______________________________________________________________________________  Procedure  Complete Portable Echo Adult. Definity (NDC #43445-528) given intravenously.  ______________________________________________________________________________  Interpretation Summary     Left ventricular size, wall motion and function are normal. The ejection  fraction is 60-65%.  Normal right ventricle size and systolic function.  Trivial pericardial effusion  Pericardial fat pad noted  Right ventricular systolic pressure is elevated, consistent with mild  pulmonary hypertension.  IVC diameter >2.1 cm collapsing <50% with sniff suggests a high RA pressure  estimated at 15 mmHg or greater.  No hemodynamically significant valvular abnormalities on 2D or color flow  imaging.  ______________________________________________________________________________  Left Ventricle  Left ventricular size, wall motion and function are normal. The ejection  fraction is 60-65%. There is borderline concentric left ventricular  hypertrophy. Left ventricular diastolic function is normal. No regional wall  motion abnormalities noted.     Right Ventricle  Normal right ventricle size and systolic function.     Atria  Normal left atrial size. Right atrial size is normal. There is no color  Doppler evidence of an atrial shunt.     Mitral Valve  Mitral valve leaflets appear normal. There is mild (1+) mitral regurgitation.     Tricuspid Valve  Tricuspid valve leaflets appear normal. There is mild (1+) tricuspid  regurgitation. The right ventricular systolic pressure is approximated  at 31.5  mmHg plus the right atrial pressure. Right ventricular systolic pressure is  elevated, consistent with mild pulmonary hypertension.     Aortic Valve  Aortic valve leaflets appear normal. There is no evidence of aortic stenosis  or clinically significant aortic regurgitation.     Pulmonic Valve  The pulmonic valve is not well seen, but is grossly normal. This degree of  valvular regurgitation is within normal limits.     Vessels  The aorta root is normal. Ascending Aorta dilatation is present. IVC diameter  >2.1 cm collapsing <50% with sniff suggests a high RA pressure estimated at 15  mmHg or greater.     Pericardium  Trivial pericardial effusion. Pericardial fat pad noted.     ______________________________________________________________________________  MMode/2D Measurements & Calculations  IVSd: 1.1 cm  LVIDd: 4.8 cm  LVIDs: 3.0 cm  LVPWd: 1.1 cm  FS: 38.0 %     LV mass(C)d: 191.3 grams  LV mass(C)dI: 92.2 grams/m2  Ao root diam: 3.4 cm  asc Aorta Diam: 4.0 cm  LVOT diam: 2.2 cm  LVOT area: 3.7 cm2  Ao root diam index Ht(cm/m): 2.0  Ao root diam index BSA (cm/m2): 1.6  Asc Ao diam index BSA (cm/m2): 1.9  Asc Ao diam index Ht(cm/m): 2.3  EF Biplane: 55.4 %  LA Volume (BP): 58.1 ml     LA Volume Index (BP): 28.1 ml/m2  LA Volume Indexed (AL/bp): 28.9 ml/m2  RV Base: 3.9 cm  RWT: 0.44  TAPSE: 1.9 cm     Time Measurements  MM HR: 74.0 BPM     Doppler Measurements & Calculations  MV E max flower: 92.5 cm/sec  MV A max flower: 44.9 cm/sec  MV E/A: 2.1  MV max P.1 mmHg  MV mean P.6 mmHg  MV V2 VTI: 25.4 cm  MVA(VTI): 2.8 cm2  MV dec slope: 454.8 cm/sec2  MV dec time: 0.20 sec  Ao V2 max: 121.2 cm/sec  Ao max P.0 mmHg  Ao V2 mean: 99.5 cm/sec  Ao mean P.2 mmHg  Ao V2 VTI: 25.0 cm  CANDIDA(I,D): 2.9 cm2  CANDIDA(V,D): 2.9 cm2  LV V1 max PG: 3.5 mmHg  LV V1 max: 94.0 cm/sec  LV V1 VTI: 19.4 cm  SV(LVOT): 71.4 ml  SI(LVOT): 34.4 ml/m2  PA V2 max: 81.2 cm/sec  PA max P.6 mmHg  PA acc time: 0.09 sec  TR max  javon: 280.6 cm/sec  TR max P.5 mmHg  AV Javon Ratio (DI): 0.78  CANDIDA Index (cm2/m2): 1.4  E/E': 9.1  E/E' av.6  Lateral E/e': 9.0  Medial E/e': 10.1  Peak E' Javon: 10.2 cm/sec     RV S Javon: 15.0 cm/sec     ______________________________________________________________________________  Report approved by: Yunior Bain 2024 09:53 AM

## 2024-11-30 NOTE — PLAN OF CARE
Problem: Adult Inpatient Plan of Care  Goal: Optimal Comfort and Wellbeing  Outcome: Progressing     Problem: Chest Pain  Goal: Resolution of Chest Pain Symptoms  Outcome: Progressing     Problem: Infection  Goal: Absence of Infection Signs and Symptoms  Outcome: Progressing     Problem: Dysrhythmia  Goal: Normalized Cardiac Rhythm  Outcome: Progressing   Goal Outcome Evaluation:       A/Ox4. Patient denies chest pain overnight. Tele SR. Up independently in room. Call light within reach.

## 2024-12-02 ENCOUNTER — PATIENT OUTREACH (OUTPATIENT)
Dept: CARE COORDINATION | Facility: CLINIC | Age: 69
End: 2024-12-02
Payer: COMMERCIAL

## 2024-12-02 LAB
ATRIAL RATE - MUSE: 66 BPM
DIASTOLIC BLOOD PRESSURE - MUSE: NORMAL MMHG
INTERPRETATION ECG - MUSE: NORMAL
P AXIS - MUSE: 47 DEGREES
PR INTERVAL - MUSE: 160 MS
QRS DURATION - MUSE: 84 MS
QT - MUSE: 454 MS
QTC - MUSE: 475 MS
R AXIS - MUSE: 4 DEGREES
SYSTOLIC BLOOD PRESSURE - MUSE: NORMAL MMHG
T AXIS - MUSE: 139 DEGREES
VENTRICULAR RATE- MUSE: 66 BPM

## 2024-12-02 PROCEDURE — 93010 ELECTROCARDIOGRAM REPORT: CPT | Mod: RTG | Performed by: STUDENT IN AN ORGANIZED HEALTH CARE EDUCATION/TRAINING PROGRAM

## 2024-12-02 NOTE — PROGRESS NOTES
[facility]    Background: Transitional Care Management program identified per system criteria and reviewed by [facility] team for possible outreach.    Assessment: Upon chart review, CCR Team member will not proceed with patient outreach related to this episode of Transitional Care Management program due to reason below:    Patient has a follow up appointment with an appropriate provider today for hospital discharge    Patient has an appointment today with a provider in Cardiology/Cardiovascular Disease from [facility]. Patients appointment today is appropriate for ED/Hospital discharge and follow-up. No [facility] outreach call needed at this time.     Plan: Transitional Care Management episode addressed appropriately per reason noted above.      ALICIA Odell  [phone]  Connected Care Resource [facility]   *Connected Care Resource Team does NOT follow patient ongoing. Referrals are identified based on internal discharge reports and the outreach is to ensure patient has an understanding of their discharge instructions.

## 2024-12-18 ENCOUNTER — HOSPITAL ENCOUNTER (EMERGENCY)
Facility: CLINIC | Age: 69
Discharge: HOME | End: 2024-12-18
Attending: EMERGENCY MEDICINE
Payer: COMMERCIAL

## 2024-12-18 ENCOUNTER — APPOINTMENT (OUTPATIENT)
Dept: RADIOLOGY | Facility: CLINIC | Age: 69
End: 2024-12-18
Attending: EMERGENCY MEDICINE
Payer: COMMERCIAL

## 2024-12-18 VITALS
HEART RATE: 74 BPM | SYSTOLIC BLOOD PRESSURE: 110 MMHG | BODY MASS INDEX: 30.31 KG/M2 | WEIGHT: 200 LBS | TEMPERATURE: 98.8 F | RESPIRATION RATE: 24 BRPM | OXYGEN SATURATION: 94 % | DIASTOLIC BLOOD PRESSURE: 71 MMHG | HEIGHT: 68 IN

## 2024-12-18 DIAGNOSIS — I30.9 ACUTE PERICARDITIS, UNSPECIFIED TYPE: ICD-10-CM

## 2024-12-18 LAB
ANION GAP SERPL CALCULATED.3IONS-SCNC: 14 MMOL/L (ref 7–15)
ATRIAL RATE - MUSE: 80 BPM
BASOPHILS # BLD AUTO: 0 10E3/UL (ref 0–0.2)
BASOPHILS NFR BLD AUTO: 0 %
BUN SERPL-MCNC: 13.5 MG/DL (ref 8–23)
CALCIUM SERPL-MCNC: 9.2 MG/DL (ref 8.8–10.4)
CHLORIDE SERPL-SCNC: 100 MMOL/L (ref 98–107)
CREAT SERPL-MCNC: 0.78 MG/DL (ref 0.51–0.95)
CRP SERPL-MCNC: 112 MG/L
DIASTOLIC BLOOD PRESSURE - MUSE: 61 MMHG
EGFRCR SERPLBLD CKD-EPI 2021: 83 ML/MIN/1.73M2
EOSINOPHIL # BLD AUTO: 0 10E3/UL (ref 0–0.7)
EOSINOPHIL NFR BLD AUTO: 0 %
ERYTHROCYTE [DISTWIDTH] IN BLOOD BY AUTOMATED COUNT: 14.3 % (ref 10–15)
FLUAV RNA SPEC QL NAA+PROBE: NEGATIVE
FLUBV RNA RESP QL NAA+PROBE: NEGATIVE
GLUCOSE SERPL-MCNC: 302 MG/DL (ref 70–99)
HCO3 SERPL-SCNC: 20 MMOL/L (ref 22–29)
HCT VFR BLD AUTO: 39.9 % (ref 35–47)
HGB BLD-MCNC: 13.2 G/DL (ref 11.7–15.7)
IMM GRANULOCYTES # BLD: 0.3 10E3/UL
IMM GRANULOCYTES NFR BLD: 3 %
INTERPRETATION ECG - MUSE: NORMAL
LYMPHOCYTES # BLD AUTO: 1.2 10E3/UL (ref 0.8–5.3)
LYMPHOCYTES NFR BLD AUTO: 9 %
MCH RBC QN AUTO: 28.4 PG (ref 26.5–33)
MCHC RBC AUTO-ENTMCNC: 33.1 G/DL (ref 31.5–36.5)
MCV RBC AUTO: 86 FL (ref 78–100)
MONOCYTES # BLD AUTO: 0.9 10E3/UL (ref 0–1.3)
MONOCYTES NFR BLD AUTO: 6 %
NEUTROPHILS # BLD AUTO: 11.3 10E3/UL (ref 1.6–8.3)
NEUTROPHILS NFR BLD AUTO: 82 %
NRBC # BLD AUTO: 0 10E3/UL
NRBC BLD AUTO-RTO: 0 /100
P AXIS - MUSE: 57 DEGREES
PLATELET # BLD AUTO: 234 10E3/UL (ref 150–450)
POTASSIUM SERPL-SCNC: 4.7 MMOL/L (ref 3.4–5.3)
PR INTERVAL - MUSE: 150 MS
QRS DURATION - MUSE: 66 MS
QT - MUSE: 376 MS
QTC - MUSE: 433 MS
R AXIS - MUSE: 26 DEGREES
RBC # BLD AUTO: 4.64 10E6/UL (ref 3.8–5.2)
RSV RNA SPEC NAA+PROBE: NEGATIVE
SARS-COV-2 RNA RESP QL NAA+PROBE: NEGATIVE
SODIUM SERPL-SCNC: 134 MMOL/L (ref 135–145)
SYSTOLIC BLOOD PRESSURE - MUSE: 110 MMHG
T AXIS - MUSE: 95 DEGREES
TROPONIN T SERPL HS-MCNC: 12 NG/L
TROPONIN T SERPL HS-MCNC: 12 NG/L
VENTRICULAR RATE- MUSE: 80 BPM
WBC # BLD AUTO: 13.8 10E3/UL (ref 4–11)

## 2024-12-18 PROCEDURE — 93005 ELECTROCARDIOGRAM TRACING: CPT | Performed by: EMERGENCY MEDICINE

## 2024-12-18 PROCEDURE — 71046 X-RAY EXAM CHEST 2 VIEWS: CPT

## 2024-12-18 PROCEDURE — 99285 EMERGENCY DEPT VISIT HI MDM: CPT | Mod: 25

## 2024-12-18 PROCEDURE — 85025 COMPLETE CBC W/AUTO DIFF WBC: CPT | Performed by: EMERGENCY MEDICINE

## 2024-12-18 PROCEDURE — 84484 ASSAY OF TROPONIN QUANT: CPT | Performed by: EMERGENCY MEDICINE

## 2024-12-18 PROCEDURE — 36415 COLL VENOUS BLD VENIPUNCTURE: CPT | Performed by: EMERGENCY MEDICINE

## 2024-12-18 PROCEDURE — 80048 BASIC METABOLIC PNL TOTAL CA: CPT | Performed by: EMERGENCY MEDICINE

## 2024-12-18 PROCEDURE — 87637 SARSCOV2&INF A&B&RSV AMP PRB: CPT | Performed by: EMERGENCY MEDICINE

## 2024-12-18 PROCEDURE — 86140 C-REACTIVE PROTEIN: CPT | Performed by: EMERGENCY MEDICINE

## 2024-12-18 RX ORDER — PREDNISONE 20 MG/1
10 TABLET ORAL DAILY
Qty: 5 TABLET | Refills: 0 | Status: SHIPPED | OUTPATIENT
Start: 2024-12-18

## 2024-12-18 ASSESSMENT — COLUMBIA-SUICIDE SEVERITY RATING SCALE - C-SSRS
2. HAVE YOU ACTUALLY HAD ANY THOUGHTS OF KILLING YOURSELF IN THE PAST MONTH?: NO
1. IN THE PAST MONTH, HAVE YOU WISHED YOU WERE DEAD OR WISHED YOU COULD GO TO SLEEP AND NOT WAKE UP?: NO

## 2024-12-18 ASSESSMENT — ACTIVITIES OF DAILY LIVING (ADL): ADLS_ACUITY_SCORE: 52

## 2024-12-18 NOTE — ED PROVIDER NOTES
EMERGENCY DEPARTMENT ENCOUNTER      NAME: Carri Hauser  AGE: 69 year old female  YOB: 1955  MRN: [id number]  EVALUATION DATE & TIME: No admission date for patient encounter.    PCP: Ruba Mendez    ED PROVIDER: Ketan Mayberry M.D.      Chief Complaint   Patient presents with    Chest Pain    Shortness of Breath    Cough         FINAL IMPRESSION:  1. Acute pericarditis, unspecified type          ED COURSE & MEDICAL DECISION MAKING:    Pertinent Labs & Imaging studies reviewed. (See chart for details)  3:13 PM performed my initial evaluation of the patient  4:21 PM Spoke with Dr. Farris, Cardiology  6:02 PM Repeat exam benign.  Discussed findings and discharge close follow-up.          69 year old female presents to the Emergency Department for evaluation of chest pain. Patient appears non toxic with stable vitals signs, patient afebrile with no tachycardia or hypoxia. Overall exam is benign.  Lungs are clear and abdomen is benign.  Per review of medical record, did review discharge summary through [facility] on 11/30/2024 patient was admitted for recurrent, relapsing idiopathic pericarditis, first noted in September, patient states that she has had an episode in September, October, November 2024.  Currently on an outpatient course of colchicine and prednisone.  Long course of NSAIDs were not recommended secondary to her being on anticoagulation for atrial fibrillation.  Patient states that her chest pain today is exactly the same as her previous episodes of pericarditis, she has no tachycardia, hypoxia, hypotension, considered but clinically nothing to suggest PE, dissection, cardiac tamponade, or other more malicious etiology of symptoms.  Again reassured that she is on Eliquis.  No ripping or tearing chest discomfort of the back or shoulders, again nothing is just dissection.  No fever or productive cough, lung sounds are clear with nothing suggest COVID,  influenza, pneumonia, CHF or other restrictive lung process.  We will obtain screening labs, troponin, ECG and considered CTA imaging of the chest, discussed this with the patient but ultimately felt reasonable to obtain chest x-ray given reassuring exam and vitals.  She was offered but deferred pain medications.    Reassessment: Yesterday labs by my independent interpretation showed no signs of anemia with a hemoglobin of 13.2 and no signs of acute kidney injury with a creatinine 0.78.  Suspect pericarditis with an elevated  and a white blood cell count of 13.8.  Both initial and repeat troponin 12, not markedly elevated, certainly nothing to suggest cardiac ischemia.  ECG showed no acute ischemic changes.  COVID, influenza RSV negative.  Chest x-ray by my independent interpretation showed no focal consolidation and by report showed no acute concerning findings.  Did discuss patient case with cardiology, considered admission, at this time, per recommendations from cardiology, plan will be to increase patient's daily prednisone for the next 3 days, suggested she take 20 mg day 1, 20 mg day 2 and 10 mg day 3 and then back to her 5 mg daily until prednisone is gone.  She is to follow-up with her primary cardiologist in the next 24 hours to discuss her ongoing and recurrent episodes of pericarditis and need for continued outpatient management.  Repeat exam the patient was benign and discussed all these findings recommendations with patient and  both very reassured and comfortable discharge.  Strict return precautions provided.    Medical Decision Making  Obtained supplemental history:Supplemental history obtained?: Documented in chart and Family Member/Significant Other  Reviewed external records: External records reviewed?: Documented in chart  Care impacted by chronic illness:Heart Disease  Did you consider but not order tests?: Work up considered but not performed and documented in chart, if  applicable  Did you interpret images independently?: Independent interpretation of ECG and images noted in documentation, when applicable.  Consultation discussion with other provider:Did you involve another provider (consultant, , pharmacy, etc.)?: I discussed the care with another health care provider, see documentation for details.  Discharge. I prescribed additional prescription strength medication(s) as charted. See documentation for any additional details.    MIPS: Not Applicable        At the conclusion of the encounter I discussed the results of all of the tests and the disposition. The questions were answered and return precautions provided. The patient or family acknowledged understanding and was agreeable with the care plan.         MEDICATIONS GIVEN IN THE EMERGENCY:  Medications - No data to display    NEW PRESCRIPTIONS STARTED AT TODAY'S ER VISIT  Discharge Medication List as of 12/18/2024  6:19 PM               =================================================================    HPI    Patient information was obtained from: patient and      Use of Intrepreter: N/A         Carri Hauser is a 69 year old female who presents chest pain.  Patient states that she has had chest pain for the past 4 days, describes it as sharp.  Worse with certain positions, better when leaning forward.  Associated with radiation to her shoulder and shortness of breath.  Patient endorses similar episodes of chest pain over the past several months, diagnosed with pericarditis.  Currently on colchicine and prednisone.  History of atrial fibrillation and currently taking sotalol and Eliquis.  Otherwise denies any fevers, hematemesis, change in bowel or bladder habits, blood in her urine or stools, or focal weakness.    PAST MEDICAL HISTORY:  No past medical history on file.    PAST SURGICAL HISTORY:  No past surgical history on file.      CURRENT MEDICATIONS:    Prior to Admission medications    Medication Sig Start  "Date End Date Taking? Authorizing Provider   colchicine (COLCRYS) 0.6 MG tablet Take 0.5 tablets (0.3 mg) by mouth daily for 3 days, THEN 1 tablet (0.6 mg) daily. 11/30/24 1/2/25  Maida Gonzales,    lisinopril (ZESTRIL) 20 MG tablet Take 20 mg by mouth at bedtime.    Unknown, Entered By History   omeprazole (PRILOSEC) 20 MG DR capsule Take 20 mg by mouth daily.    Unknown, Entered By History   predniSONE (DELTASONE) 20 MG tablet Take 2 tablets (40 mg) by mouth daily for 4 days, THEN 1.5 tablets (30 mg) daily for 4 days, THEN 1 tablet (20 mg) daily for 4 days, THEN 0.5 tablets (10 mg) daily for 4 days. 12/1/24 12/17/24  Maida Gonzales DO   rosuvastatin (CRESTOR) 10 MG tablet Take 10 mg by mouth at bedtime.    Unknown, Entered By History   sotalol (BETAPACE) 80 MG tablet Take 0.5 tablets (40 mg) by mouth daily. 12/1/24   Maida Gonzales DO   triamcinolone (KENALOG) 0.1 % external cream Apply topically 3 times daily as needed for irritation.    Unknown, Entered By History        ALLERGIES:  Allergies   Allergen Reactions    Pseudoephedrine Other (See Comments)     Other Reaction(s): Tachycardia    Tetracycline Hives    Adhesive Tape Rash     Echocardiogram stickers    Tylenol [Acetaminophen] Rash     All over chest           VITALS:  Patient Vitals for the past 24 hrs:   BP Temp Temp src Pulse Resp SpO2 Height Weight   12/18/24 1800 110/71 -- -- 74 24 94 % -- --   12/18/24 1740 107/65 -- -- 75 22 96 % -- --   12/18/24 1736 113/67 -- -- 75 20 95 % -- --   12/18/24 1418 -- 98.8  F (37.1  C) Oral -- -- -- -- --   12/18/24 1411 110/61 -- -- 79 18 95 % 1.727 m (5' 8\") 90.7 kg (200 lb)        PHYSICAL EXAM    Constitutional:  Awake, alert, in no apparent distress  HENT:  Normocephalic, Atraumatic. Bilateral external ears normal. Oropharynx moist. Nose normal. Neck- Normal range of motion with no guarding, No midline cervical tenderness, Supple, No stridor.   Eyes:  PERRL, EOMI with no signs " of entrapment, Conjunctiva normal, No discharge.   Respiratory:  Normal breath sounds, No respiratory distress, No wheezing.    Cardiovascular:  Normal heart rate, Normal rhythm  GI:  Soft, No tenderness, No distension, No palpable masses  Musculoskeletal:  Intact distal pulses, No edema. Good range of motion in all major joints. No tenderness to palpation or major deformities noted.  Integument:  Warm, Dry, No erythema, No rash.   Neurologic:  Alert & oriented, Normal motor function, Normal sensory function, No focal deficits noted.   Psychiatric:  Affect normal, Judgment normal, Mood normal.     LAB:  All pertinent labs reviewed and interpreted.  Results for orders placed or performed during the hospital encounter of 12/18/24   Chest XR,  PA & LAT    Impression    IMPRESSION:     Cardiac silhouette is normal in size. Vascular pedicle is normal. Hilar contours and lung vascularity are normal.    There are focal opacities in both bases along the diaphragmatic pleura consistent with subsegmental atelectasis. A small left pleural effusion is also present. No pneumothorax.    Small degenerative osteophytes of the lower thoracic and upper lumbar spine.   Influenza A/B, RSV and SARS-CoV2 PCR (COVID-19) Nasopharyngeal    Specimen: Nasopharyngeal; Swab   Result Value Ref Range    Influenza A PCR Negative Negative    Influenza B PCR Negative Negative    RSV PCR Negative Negative    SARS CoV2 PCR Negative Negative   Result Value Ref Range    Troponin T, High Sensitivity 12 <=14 ng/L   Basic metabolic panel   Result Value Ref Range    Sodium 134 (L) 135 - 145 mmol/L    Potassium 4.7 3.4 - 5.3 mmol/L    Chloride 100 98 - 107 mmol/L    Carbon Dioxide (CO2) 20 (L) 22 - 29 mmol/L    Anion Gap 14 7 - 15 mmol/L    Urea Nitrogen 13.5 8.0 - 23.0 mg/dL    Creatinine 0.78 0.51 - 0.95 mg/dL    GFR Estimate 83 >60 mL/min/1.73m2    Calcium 9.2 8.8 - 10.4 mg/dL    Glucose 302 (H) 70 - 99 mg/dL   Result Value Ref Range    CRP Inflammation  112.00 (H) <5.00 mg/L   CBC with platelets and differential   Result Value Ref Range    WBC Count 13.8 (H) 4.0 - 11.0 10e3/uL    RBC Count 4.64 3.80 - 5.20 10e6/uL    Hemoglobin 13.2 11.7 - 15.7 g/dL    Hematocrit 39.9 35.0 - 47.0 %    MCV 86 78 - 100 fL    MCH 28.4 26.5 - 33.0 pg    MCHC 33.1 31.5 - 36.5 g/dL    RDW 14.3 10.0 - 15.0 %    Platelet Count 234 150 - 450 10e3/uL    % Neutrophils 82 %    % Lymphocytes 9 %    % Monocytes 6 %    % Eosinophils 0 %    % Basophils 0 %    % Immature Granulocytes 3 %    NRBCs per 100 WBC 0 <1 /100    Absolute Neutrophils 11.3 (H) 1.6 - 8.3 10e3/uL    Absolute Lymphocytes 1.2 0.8 - 5.3 10e3/uL    Absolute Monocytes 0.9 0.0 - 1.3 10e3/uL    Absolute Eosinophils 0.0 0.0 - 0.7 10e3/uL    Absolute Basophils 0.0 0.0 - 0.2 10e3/uL    Absolute Immature Granulocytes 0.3 <=0.4 10e3/uL    Absolute NRBCs 0.0 10e3/uL   Result Value Ref Range    Troponin T, High Sensitivity 12 <=14 ng/L   ECG 12-LEAD WITH MUSE (LHE)   Result Value Ref Range    Systolic Blood Pressure 110 mmHg    Diastolic Blood Pressure 61 mmHg    Ventricular Rate 80 BPM    Atrial Rate 80 BPM    ID Interval 150 ms    QRS Duration 66 ms     ms    QTc 433 ms    P Axis 57 degrees    R AXIS 26 degrees    T Axis 95 degrees    Interpretation ECG       Sinus rhythm  Septal infarct , age undetermined  T wave abnormality, consider lateral ischemia  Abnormal ECG  When compared with ECG of 30-Nov-2024 09:34,  Septal infarct is now Present  T wave inversion less evident in Anterolateral leads  Confirmed by SEE ED PROVIDER NOTE FOR, ECG INTERPRETATION ([id number]),  Julia Hernandez ([id number]) on 12/18/2024 3:29:09 PM         RADIOLOGY:  Chest XR,  PA & LAT   Final Result   IMPRESSION:       Cardiac silhouette is normal in size. Vascular pedicle is normal. Hilar contours and lung vascularity are normal.      There are focal opacities in both bases along the diaphragmatic pleura consistent with subsegmental atelectasis. A small left  pleural effusion is also present. No pneumothorax.      Small degenerative osteophytes of the lower thoracic and upper lumbar spine.             EKG:    Sinus rhythm, no specific ST acute ischemic changes, no concerning dysrhythmias, did note T wave inversions V1, V2, when compared to ECG of November 30, 2024, T wave inversions less evident in the lateral leads, no specific ST acute ischemic change appreciated  I have independently reviewed and interpreted the EKG(s) documented above.    PROCEDURES:        [facility] Documentation:   CMS Diagnoses:       I, KETAN MAYBERRY MD, am serving as a scribe to document services personally performed by Ketan Mayberry MD, based on my observation and the provider's statements to me. I, Ketan Mayberry MD attest that KETAN MAYBERRY MD is acting in a scribe capacity, has observed my performance of the services and has documented them in accordance with my direction.    Ketan Mayberry M.D.  Emergency Medicine  [facility] EMERGENCY ROOM  [address] [id number] [phone]  Dept: [phone]       Ketan Mayberry MD  12/18/24 [id number]

## 2024-12-18 NOTE — ED TRIAGE NOTES
Pt with hx of pericarditis in September with 6 ER visits since. Pt reports CP that radiates to L shoulder since Monday. Cough and SOB since Sunday. Heating pad improves it. Pt with hx of frequent intermittent afib and on eliquis. Alert.     Triage Assessment (Adult)       Row Name 12/18/24 1411          Triage Assessment    Airway WDL WDL        Respiratory WDL    Respiratory WDL X;rhythm/pattern;cough     Rhythm/Pattern, Respiratory dyspnea upon exertion;shortness of breath     Cough Frequency infrequent        Skin Circulation/Temperature WDL    Skin Circulation/Temperature WDL WDL        Cardiac WDL    Cardiac WDL X;chest pain        Chest Pain Assessment    Chest Pain Location anterior chest, left;shoulder, left     Chest Pain Radiation shoulder     Character aching     Duration Monday     Precipitating Factors unknown     Alleviating Factors other (comment)  heating pad     Associated Signs/Symptoms dyspnea;other (see comments)  loss of appetite     Chest Pain Intervention 12-lead ECG obtained        Peripheral/Neurovascular WDL    Peripheral Neurovascular WDL WDL        Cognitive/Neuro/Behavioral WDL    Cognitive/Neuro/Behavioral WDL WDL